# Patient Record
Sex: FEMALE
[De-identification: names, ages, dates, MRNs, and addresses within clinical notes are randomized per-mention and may not be internally consistent; named-entity substitution may affect disease eponyms.]

---

## 2018-09-13 ENCOUNTER — APPOINTMENT (OUTPATIENT)
Dept: ANTEPARTUM | Facility: CLINIC | Age: 33
End: 2018-09-13
Payer: COMMERCIAL

## 2018-09-13 ENCOUNTER — ASOB RESULT (OUTPATIENT)
Age: 33
End: 2018-09-13

## 2018-09-13 PROBLEM — Z00.00 ENCOUNTER FOR PREVENTIVE HEALTH EXAMINATION: Status: ACTIVE | Noted: 2018-09-13

## 2018-09-13 PROCEDURE — 36416 COLLJ CAPILLARY BLOOD SPEC: CPT

## 2018-09-13 PROCEDURE — 76813 OB US NUCHAL MEAS 1 GEST: CPT

## 2018-09-13 PROCEDURE — 76801 OB US < 14 WKS SINGLE FETUS: CPT

## 2018-10-30 ENCOUNTER — APPOINTMENT (OUTPATIENT)
Dept: ANTEPARTUM | Facility: CLINIC | Age: 33
End: 2018-10-30
Payer: COMMERCIAL

## 2018-10-30 ENCOUNTER — ASOB RESULT (OUTPATIENT)
Age: 33
End: 2018-10-30

## 2018-10-30 PROCEDURE — 76805 OB US >/= 14 WKS SNGL FETUS: CPT

## 2019-03-27 ENCOUNTER — INPATIENT (INPATIENT)
Facility: HOSPITAL | Age: 34
LOS: 1 days | Discharge: HOME | End: 2019-03-29
Attending: OBSTETRICS & GYNECOLOGY | Admitting: OBSTETRICS & GYNECOLOGY

## 2019-03-27 ENCOUNTER — TRANSCRIPTION ENCOUNTER (OUTPATIENT)
Age: 34
End: 2019-03-27

## 2019-03-27 VITALS
HEART RATE: 61 BPM | RESPIRATION RATE: 18 BRPM | SYSTOLIC BLOOD PRESSURE: 131 MMHG | TEMPERATURE: 98 F | DIASTOLIC BLOOD PRESSURE: 61 MMHG

## 2019-03-27 DIAGNOSIS — O26.899 OTHER SPECIFIED PREGNANCY RELATED CONDITIONS, UNSPECIFIED TRIMESTER: ICD-10-CM

## 2019-03-27 DIAGNOSIS — Z3A.00 WEEKS OF GESTATION OF PREGNANCY NOT SPECIFIED: ICD-10-CM

## 2019-03-27 LAB
BASOPHILS # BLD AUTO: 0.02 K/UL — SIGNIFICANT CHANGE UP (ref 0–0.2)
BASOPHILS NFR BLD AUTO: 0.2 % — SIGNIFICANT CHANGE UP (ref 0–2)
BLD GP AB SCN SERPL QL: NEGATIVE — SIGNIFICANT CHANGE UP
EOSINOPHIL # BLD AUTO: 0.01 K/UL — SIGNIFICANT CHANGE UP (ref 0–0.5)
EOSINOPHIL NFR BLD AUTO: 0.1 % — SIGNIFICANT CHANGE UP (ref 0–6)
HCT VFR BLD CALC: 35.5 % — SIGNIFICANT CHANGE UP (ref 34.5–45)
HGB BLD-MCNC: 11.5 G/DL — SIGNIFICANT CHANGE UP (ref 11.5–15.5)
IMM GRANULOCYTES NFR BLD AUTO: 0.4 % — SIGNIFICANT CHANGE UP (ref 0–1.5)
LYMPHOCYTES # BLD AUTO: 1.39 K/UL — SIGNIFICANT CHANGE UP (ref 1–3.3)
LYMPHOCYTES # BLD AUTO: 11.3 % — LOW (ref 13–44)
MCHC RBC-ENTMCNC: 30.2 PG — SIGNIFICANT CHANGE UP (ref 27–34)
MCHC RBC-ENTMCNC: 32.4 % — SIGNIFICANT CHANGE UP (ref 32–36)
MCV RBC AUTO: 93.2 FL — SIGNIFICANT CHANGE UP (ref 80–100)
MONOCYTES # BLD AUTO: 0.49 K/UL — SIGNIFICANT CHANGE UP (ref 0–0.9)
MONOCYTES NFR BLD AUTO: 4 % — SIGNIFICANT CHANGE UP (ref 2–14)
NEUTROPHILS # BLD AUTO: 10.38 K/UL — HIGH (ref 1.8–7.4)
NEUTROPHILS NFR BLD AUTO: 84 % — HIGH (ref 43–77)
NRBC # FLD: 0 K/UL — SIGNIFICANT CHANGE UP (ref 0–0)
PLATELET # BLD AUTO: 188 K/UL — SIGNIFICANT CHANGE UP (ref 150–400)
PMV BLD: 11.9 FL — SIGNIFICANT CHANGE UP (ref 7–13)
RBC # BLD: 3.81 M/UL — SIGNIFICANT CHANGE UP (ref 3.8–5.2)
RBC # FLD: 12.2 % — SIGNIFICANT CHANGE UP (ref 10.3–14.5)
RH IG SCN BLD-IMP: POSITIVE — SIGNIFICANT CHANGE UP
RH IG SCN BLD-IMP: POSITIVE — SIGNIFICANT CHANGE UP
T PALLIDUM AB TITR SER: NEGATIVE — SIGNIFICANT CHANGE UP
WBC # BLD: 12.34 K/UL — HIGH (ref 3.8–10.5)
WBC # FLD AUTO: 12.34 K/UL — HIGH (ref 3.8–10.5)

## 2019-03-27 RX ORDER — OXYTOCIN 10 UNIT/ML
333.33 VIAL (ML) INJECTION
Qty: 20 | Refills: 0 | Status: COMPLETED | OUTPATIENT
Start: 2019-03-27

## 2019-03-27 RX ORDER — SODIUM CHLORIDE 9 MG/ML
1000 INJECTION, SOLUTION INTRAVENOUS
Refills: 0 | Status: DISCONTINUED | OUTPATIENT
Start: 2019-03-27 | End: 2019-03-27

## 2019-03-27 RX ORDER — AER TRAVELER 0.5 G/1
1 SOLUTION RECTAL; TOPICAL EVERY 4 HOURS
Refills: 0 | Status: DISCONTINUED | OUTPATIENT
Start: 2019-03-27 | End: 2019-03-29

## 2019-03-27 RX ORDER — HYDROCORTISONE 1 %
1 OINTMENT (GRAM) TOPICAL EVERY 4 HOURS
Refills: 0 | Status: DISCONTINUED | OUTPATIENT
Start: 2019-03-27 | End: 2019-03-29

## 2019-03-27 RX ORDER — OXYTOCIN 10 UNIT/ML
2 VIAL (ML) INJECTION
Qty: 30 | Refills: 0 | Status: DISCONTINUED | OUTPATIENT
Start: 2019-03-27 | End: 2019-03-28

## 2019-03-27 RX ORDER — PRAMOXINE HYDROCHLORIDE 150 MG/15G
1 AEROSOL, FOAM RECTAL EVERY 4 HOURS
Refills: 0 | Status: DISCONTINUED | OUTPATIENT
Start: 2019-03-27 | End: 2019-03-29

## 2019-03-27 RX ORDER — OXYCODONE HYDROCHLORIDE 5 MG/1
5 TABLET ORAL EVERY 4 HOURS
Refills: 0 | Status: DISCONTINUED | OUTPATIENT
Start: 2019-03-27 | End: 2019-03-29

## 2019-03-27 RX ORDER — MAGNESIUM HYDROXIDE 400 MG/1
30 TABLET, CHEWABLE ORAL
Refills: 0 | Status: DISCONTINUED | OUTPATIENT
Start: 2019-03-27 | End: 2019-03-29

## 2019-03-27 RX ORDER — DIBUCAINE 1 %
1 OINTMENT (GRAM) RECTAL EVERY 4 HOURS
Refills: 0 | Status: DISCONTINUED | OUTPATIENT
Start: 2019-03-27 | End: 2019-03-27

## 2019-03-27 RX ORDER — LANOLIN
1 OINTMENT (GRAM) TOPICAL EVERY 6 HOURS
Refills: 0 | Status: DISCONTINUED | OUTPATIENT
Start: 2019-03-27 | End: 2019-03-29

## 2019-03-27 RX ORDER — SIMETHICONE 80 MG/1
80 TABLET, CHEWABLE ORAL EVERY 6 HOURS
Refills: 0 | Status: DISCONTINUED | OUTPATIENT
Start: 2019-03-27 | End: 2019-03-29

## 2019-03-27 RX ORDER — IBUPROFEN 200 MG
600 TABLET ORAL EVERY 6 HOURS
Refills: 0 | Status: DISCONTINUED | OUTPATIENT
Start: 2019-03-27 | End: 2019-03-29

## 2019-03-27 RX ORDER — AER TRAVELER 0.5 G/1
1 SOLUTION RECTAL; TOPICAL EVERY 4 HOURS
Refills: 0 | Status: DISCONTINUED | OUTPATIENT
Start: 2019-03-27 | End: 2019-03-27

## 2019-03-27 RX ORDER — PRAMOXINE HYDROCHLORIDE 150 MG/15G
1 AEROSOL, FOAM RECTAL EVERY 4 HOURS
Refills: 0 | Status: DISCONTINUED | OUTPATIENT
Start: 2019-03-27 | End: 2019-03-27

## 2019-03-27 RX ORDER — SODIUM CHLORIDE 9 MG/ML
1000 INJECTION, SOLUTION INTRAVENOUS ONCE
Refills: 0 | Status: COMPLETED | OUTPATIENT
Start: 2019-03-27 | End: 2019-03-27

## 2019-03-27 RX ORDER — OXYTOCIN 10 UNIT/ML
333.33 VIAL (ML) INJECTION
Qty: 20 | Refills: 0 | Status: DISCONTINUED | OUTPATIENT
Start: 2019-03-27 | End: 2019-03-28

## 2019-03-27 RX ORDER — IBUPROFEN 200 MG
600 TABLET ORAL EVERY 6 HOURS
Refills: 0 | Status: COMPLETED | OUTPATIENT
Start: 2019-03-27 | End: 2020-02-23

## 2019-03-27 RX ORDER — OXYTOCIN 10 UNIT/ML
41.67 VIAL (ML) INJECTION
Qty: 20 | Refills: 0 | Status: DISCONTINUED | OUTPATIENT
Start: 2019-03-27 | End: 2019-03-27

## 2019-03-27 RX ORDER — KETOROLAC TROMETHAMINE 30 MG/ML
30 SYRINGE (ML) INJECTION ONCE
Refills: 0 | Status: DISCONTINUED | OUTPATIENT
Start: 2019-03-27 | End: 2019-03-27

## 2019-03-27 RX ORDER — ACETAMINOPHEN 500 MG
975 TABLET ORAL EVERY 6 HOURS
Refills: 0 | Status: COMPLETED | OUTPATIENT
Start: 2019-03-27 | End: 2020-02-23

## 2019-03-27 RX ORDER — HYDROCORTISONE 1 %
1 OINTMENT (GRAM) TOPICAL EVERY 4 HOURS
Refills: 0 | Status: DISCONTINUED | OUTPATIENT
Start: 2019-03-27 | End: 2019-03-27

## 2019-03-27 RX ORDER — SODIUM CHLORIDE 9 MG/ML
3 INJECTION INTRAMUSCULAR; INTRAVENOUS; SUBCUTANEOUS EVERY 8 HOURS
Refills: 0 | Status: DISCONTINUED | OUTPATIENT
Start: 2019-03-27 | End: 2019-03-27

## 2019-03-27 RX ORDER — DOCUSATE SODIUM 100 MG
100 CAPSULE ORAL
Refills: 0 | Status: DISCONTINUED | OUTPATIENT
Start: 2019-03-27 | End: 2019-03-29

## 2019-03-27 RX ORDER — ACETAMINOPHEN 500 MG
975 TABLET ORAL EVERY 6 HOURS
Refills: 0 | Status: DISCONTINUED | OUTPATIENT
Start: 2019-03-27 | End: 2019-03-29

## 2019-03-27 RX ORDER — GLYCERIN ADULT
1 SUPPOSITORY, RECTAL RECTAL AT BEDTIME
Refills: 0 | Status: DISCONTINUED | OUTPATIENT
Start: 2019-03-27 | End: 2019-03-29

## 2019-03-27 RX ORDER — OXYCODONE HYDROCHLORIDE 5 MG/1
5 TABLET ORAL
Refills: 0 | Status: DISCONTINUED | OUTPATIENT
Start: 2019-03-27 | End: 2019-03-29

## 2019-03-27 RX ORDER — DIPHENHYDRAMINE HCL 50 MG
25 CAPSULE ORAL EVERY 6 HOURS
Refills: 0 | Status: DISCONTINUED | OUTPATIENT
Start: 2019-03-27 | End: 2019-03-29

## 2019-03-27 RX ORDER — DIBUCAINE 1 %
1 OINTMENT (GRAM) RECTAL EVERY 4 HOURS
Refills: 0 | Status: DISCONTINUED | OUTPATIENT
Start: 2019-03-27 | End: 2019-03-29

## 2019-03-27 RX ADMIN — Medication 1000 MILLIUNIT(S)/MIN: at 15:00

## 2019-03-27 RX ADMIN — SODIUM CHLORIDE 250 MILLILITER(S): 9 INJECTION, SOLUTION INTRAVENOUS at 06:29

## 2019-03-27 RX ADMIN — Medication 125 MILLIUNIT(S)/MIN: at 15:10

## 2019-03-27 RX ADMIN — Medication 30 MILLIGRAM(S): at 15:35

## 2019-03-27 RX ADMIN — Medication 975 MILLIGRAM(S): at 23:46

## 2019-03-27 RX ADMIN — Medication 2 MILLIUNIT(S)/MIN: at 10:08

## 2019-03-27 RX ADMIN — SODIUM CHLORIDE 2000 MILLILITER(S): 9 INJECTION, SOLUTION INTRAVENOUS at 05:20

## 2019-03-27 RX ADMIN — Medication 600 MILLIGRAM(S): at 23:47

## 2019-03-27 NOTE — OB RN PATIENT PROFILE - PURPOSEFUL PROACTIVE ROUNDING
ANTICOAGULATION FOLLOW-UP CLINIC VISIT    Patient Name:  Wally Cano  Date:  5/8/2017  Contact Type:  Face to Face    SUBJECTIVE:     Patient Findings     Positives No Problem Findings           OBJECTIVE    INR Protime   Date Value Ref Range Status   05/08/2017 2.8 (A) 0.86 - 1.14 Final       ASSESSMENT / PLAN  INR assessment THER    Recheck INR In: 4 DAYS    INR Location Clinic      Anticoagulation Summary as of 5/8/2017     INR goal 2.0-3.0    Today's INR 2.8    Maintenance plan No maintenance plan    Full instructions 5/8: 5 mg; 5/9: 5 mg; 5/10: 5 mg; 5/11: 5 mg; 5/12: 5 mg; 5/13: 5 mg; Otherwise No maintenance plan    No change documented Angelique Muro RN    Next INR check 5/12/2017    Target end date 6/19/2017      Anticoagulation Episode Summary     INR check location [facility]    Preferred lab     Send INR reminders to [facility] INR    Comments Estimated time frame is 3 months of anticoag treatment              See the Encounter Report to view Anticoagulation Flowsheet and Dosing Calendar (Go to Encounters tab in chart review, and find the Anticoagulation Therapy Visit)      Angelique Muro RN                Patient

## 2019-03-27 NOTE — DISCHARGE NOTE OB - PATIENT PORTAL LINK FT
You can access the [email or web address], offered by [facility], by registering with the following website: [email or web address]

## 2019-03-27 NOTE — OB PROVIDER H&P - HISTORY OF PRESENT ILLNESS
Patient is a  @ 40 3/7wks gestation who reports to triage with c/o worsening contractions since 530 yesterday.  Denies lof/vb.  Reports good fetal movements.

## 2019-03-27 NOTE — DISCHARGE NOTE OB - CARE PROVIDER_API CALL
Jessica Lucas)  Obstetrics and Gynecology  [address] [place] [address]  Phone: [phone]  Fax: [phone]  Follow Up Time:

## 2019-03-27 NOTE — CHART NOTE - NSCHARTNOTEFT_GEN_A_CORE
PA Note    Patient seen & examined for cont of management. States she is pushing with contractions    VS  T(C): 36.9 (03-27-19 @ 06:24)  HR: 102 (03-27-19 @ 09:44)  BP: 119/73 (03-27-19 @ 09:41)  RR: 18 (03-27-19 @ 05:07)  SpO2: 96% (03-27-19 @ 09:44)    /mod dayanna/+accels/no decels  Skyline q 5-6min  SVE 4.5/80/-2    cont efm/toco  will start pitocin for augmentation of labor  d/w Dr Johnathan reaves
R1 Note 03-27-19 @ 13:48    Pt evaluated for progression and increased pressure    VITALS:  T(C): 36.8 (03-27-19 @ 10:51), Max: 36.9 (03-27-19 @ 06:24)  HR: 81 (03-27-19 @ 13:46) (61 - 118)  BP: 124/78 (03-27-19 @ 13:46) (89/54 - 142/77)  RR: 18 (03-27-19 @ 05:07) (18 - 18)  SpO2: 96% (03-27-19 @ 13:44) (93% - 100%)    EFM: , mod dayanna, +[id number], variable decels  Cement: Ctx Q2-3min  VE:9.5/90/-1 with bulging bag      IMPRESSION:   FHR Category: 1  Additional:    PLAN:  Cytotec:  [ ] Continue PO Cyto     [ ] Continue VCyto   [ ] Stop Cytotec  Pitocin: [ ] Start Pitocin   [ ] Increase Pitocin  [  ] Continue Pitocin  [ ] Decrease Pitocin   [ ] Discontinue Pitocin  [ x]Expectant management [ ]Peanut ball [ x]Labor down  [ x]Re-evaluate  Additional:    Alton Guzman PGY-1
Cat 1 fhr tracing, fhr 145 with mod variability, accels, late decels  contractions q4-5min  sve 7/90/-2 with bulging membranes  will notify Dr. Johnathan Harvey NP
S:  Pt seen and examined for cervical change given 4min discontinuity in FH tracing with audible decel per RN. Pt without complaints    O:   T(C): 36.9 (06:24)  HR: 78 (07:19)  BP: 120/65 (07:11)  RR: 18 (05:07)  SpO2: 97% (07:14)  SVE: 4/80/-2  EFM: baseline 135, mod dayanna, +accels, audible decel of unclear duration given discontinuity in tracing   Luck: ctxbs q5-8min       A/P: 33y at  at 40w3d admitted in labor   -decel resolved with repositioning. Resucitative measures initiated IVF bolus, lateral repositioning, O2  -given unclear duration of decel 2/2 discontinuity of FH plan for placement of ISE if continued discontinuity. At this time FH continuous.   -consider augmentation if ctxns space further or if no cervical change on next VE    d/w Dr. Johnathan Huerta PGY2

## 2019-03-27 NOTE — OB PROVIDER H&P - NSHPPHYSICALEXAM_GEN_ALL_CORE
Abdomen gravid, soft and nontender.  SVE - 3.5/80/-3 Intact  Cephalic presentation   GBS - negative      Discussed patient with Dr Mann.  Plan:  admit patient to L&D for management.  Approved for an epidural.

## 2019-03-27 NOTE — OB PROVIDER H&P - ASSESSMENT
Abdomen gravid, soft and nontender.  SVE - 3.5/80/-3 Intact, moderate contractions  Cephalic presentation   GBS - negative  Clinical EFW - 3100g

## 2019-03-27 NOTE — OB PROVIDER DELIVERY SUMMARY - NSPROVIDERDELIVERYNOTE_OBGYN_ALL_OB_FT
Spontaneous vaginal delivery of liveborn infant in CHERRIE position. Head delivered easily. No nuchal cord was noted. Shoulders and body delivered easily after. Infant was suctioned. No mec was noted. Infant was passed to mother for delayed cord clamping and skin to skin. After 1 minute, the cord was clamped and cut. Placenta delivered intact with a 3 vessel cord noted. Uterine fundal massage and post partum pitocin was started. Uterine fundus was firm. Vaginal exam was performed and noted intact cervix, vaginal walls and sulci. 2 degree laceration was noted and repaired with 2.0 chromic suture. Abrasion was noted and also repaired with 2.0 chromic suture. Excellent hemostasis was noted.  Count was correctx2. Pt. was stable after delivery. Spontaneous vaginal delivery of liveborn infant in CHERRIE position. Head delivered easily. No nuchal cord was noted. Shoulders and body delivered easily after. Infant was suctioned. No mec was noted. Infant was passed to mother for delayed cord clamping and skin to skin. After 1 minute, the cord was clamped and cut. Placenta delivered intact with a 3 vessel cord noted. Uterine fundal massage and post partum pitocin was started. Uterine fundus was firm. Vaginal exam was performed and noted intact cervix, vaginal walls and sulci. 2 degree laceration was noted and repaired with 2.0 chromic suture. Abrasion was noted and also repaired with 2.0 chromic suture. Excellent hemostasis was noted.  Count was correctx2. Pt. was stable after delivery.    Mother and baby in stable condition.  KAREL Mann

## 2019-03-27 NOTE — DISCHARGE NOTE OB - CARE PLAN
Principal Discharge DX:	Vaginal delivery  Goal:	recovery  Assessment and plan of treatment:	return visit x 6 weeks, pelvic rest, regular diet

## 2019-03-28 RX ADMIN — Medication 975 MILLIGRAM(S): at 00:28

## 2019-03-28 RX ADMIN — Medication 975 MILLIGRAM(S): at 07:09

## 2019-03-28 RX ADMIN — Medication 975 MILLIGRAM(S): at 12:30

## 2019-03-28 RX ADMIN — Medication 600 MILLIGRAM(S): at 06:15

## 2019-03-28 RX ADMIN — Medication 600 MILLIGRAM(S): at 17:55

## 2019-03-28 RX ADMIN — Medication 975 MILLIGRAM(S): at 17:55

## 2019-03-28 RX ADMIN — Medication 975 MILLIGRAM(S): at 12:01

## 2019-03-28 RX ADMIN — Medication 600 MILLIGRAM(S): at 23:41

## 2019-03-28 RX ADMIN — Medication 600 MILLIGRAM(S): at 12:00

## 2019-03-28 RX ADMIN — Medication 975 MILLIGRAM(S): at 18:30

## 2019-03-28 RX ADMIN — Medication 600 MILLIGRAM(S): at 07:09

## 2019-03-28 RX ADMIN — Medication 975 MILLIGRAM(S): at 06:16

## 2019-03-28 RX ADMIN — Medication 600 MILLIGRAM(S): at 00:28

## 2019-03-28 RX ADMIN — Medication 600 MILLIGRAM(S): at 18:30

## 2019-03-28 RX ADMIN — Medication 1 TABLET(S): at 12:00

## 2019-03-28 RX ADMIN — Medication 600 MILLIGRAM(S): at 12:30

## 2019-03-28 RX ADMIN — Medication 975 MILLIGRAM(S): at 23:39

## 2019-03-28 NOTE — LACTATION INITIAL EVALUATION - INTERVENTION OUTCOME
Assisted pt with positioning to facilitate deep latch on breasts bilaterally. Reviewed feeding on demand, recognizing feeding cues and utilizing feeding log. Safe skin to skin, safe sleep and rooming in promoted. Hand expression demonstrated and encouraged-colostrum noted./verbalizes understanding/demonstrates understanding of teaching/good return demonstration

## 2019-03-28 NOTE — PROGRESS NOTE ADULT - SUBJECTIVE AND OBJECTIVE BOX
S: Patient doing well. Minimal lochia. Pain controlled.    O: Vital Signs Last 24 Hrs  T(C): 36.4 (28 Mar 2019 06:29), Max: 37 (27 Mar 2019 15:01)  T(F): 97.5 (28 Mar 2019 06:29), Max: 98.6 (27 Mar 2019 15:01)  HR: 80 (28 Mar 2019 06:29) (65 - 123)  BP: 102/64 (28 Mar 2019 06:29) (102/64 - 134/73)  BP(mean): --  RR: 18 (28 Mar 2019 06:29) (17 - 18)  SpO2: 96% (28 Mar 2019 06:29) (88% - 100%)    Gen: NAD  Abd: soft, NT, ND, fundus firm below umbilicus  Lochia: moderate  Ext: no tenderness    Labs:                        11.5   12.34 )-----------( 188      ( 27 Mar 2019 04:30 )             35.5       A: 33y PPD# s/p  doing well.    Plan: stable ppd1          for discharge in am

## 2019-03-29 VITALS
RESPIRATION RATE: 18 BRPM | HEART RATE: 63 BPM | SYSTOLIC BLOOD PRESSURE: 113 MMHG | OXYGEN SATURATION: 98 % | TEMPERATURE: 99 F | DIASTOLIC BLOOD PRESSURE: 70 MMHG

## 2019-03-29 RX ADMIN — Medication 975 MILLIGRAM(S): at 06:26

## 2019-03-29 RX ADMIN — Medication 600 MILLIGRAM(S): at 00:15

## 2019-03-29 RX ADMIN — Medication 600 MILLIGRAM(S): at 06:26

## 2019-03-29 RX ADMIN — Medication 975 MILLIGRAM(S): at 00:15

## 2019-03-29 RX ADMIN — Medication 975 MILLIGRAM(S): at 06:59

## 2019-03-29 RX ADMIN — Medication 600 MILLIGRAM(S): at 06:59

## 2019-12-03 NOTE — OB RN PATIENT PROFILE - LIVING CHILDREN, OB PROFILE
0
Is This A New Presentation, Or A Follow-Up?: Skin Lesion
What Type Of Note Output Would You Prefer (Optional)?: Bullet Format
How Severe Is Your Skin Lesion?: mild
Has Your Skin Lesion Been Treated?: not been treated
Additional History: Pay would like checked it is new.

## 2020-04-23 ENCOUNTER — RESULT REVIEW (OUTPATIENT)
Age: 35
End: 2020-04-23

## 2020-05-11 PROBLEM — Z00.00 ENCOUNTER FOR PREVENTIVE HEALTH EXAMINATION: Status: ACTIVE | Noted: 2020-05-11

## 2020-10-06 ENCOUNTER — APPOINTMENT (OUTPATIENT)
Dept: ANTEPARTUM | Facility: CLINIC | Age: 35
End: 2020-10-06
Payer: COMMERCIAL

## 2020-10-06 ENCOUNTER — ASOB RESULT (OUTPATIENT)
Age: 35
End: 2020-10-06

## 2020-10-06 PROCEDURE — 76813 OB US NUCHAL MEAS 1 GEST: CPT

## 2020-10-06 PROCEDURE — 76801 OB US < 14 WKS SINGLE FETUS: CPT

## 2020-10-06 PROCEDURE — 36416 COLLJ CAPILLARY BLOOD SPEC: CPT

## 2020-11-23 ENCOUNTER — ASOB RESULT (OUTPATIENT)
Age: 35
End: 2020-11-23

## 2020-11-23 ENCOUNTER — TRANSCRIPTION ENCOUNTER (OUTPATIENT)
Age: 35
End: 2020-11-23

## 2020-11-23 ENCOUNTER — APPOINTMENT (OUTPATIENT)
Dept: ANTEPARTUM | Facility: CLINIC | Age: 35
End: 2020-11-23
Payer: COMMERCIAL

## 2020-11-23 PROCEDURE — 76805 OB US >/= 14 WKS SNGL FETUS: CPT

## 2020-12-08 ENCOUNTER — ASOB RESULT (OUTPATIENT)
Age: 35
End: 2020-12-08

## 2020-12-08 ENCOUNTER — APPOINTMENT (OUTPATIENT)
Dept: ANTEPARTUM | Facility: CLINIC | Age: 35
End: 2020-12-08
Payer: COMMERCIAL

## 2020-12-08 PROCEDURE — 76816 OB US FOLLOW-UP PER FETUS: CPT

## 2020-12-08 PROCEDURE — 99072 ADDL SUPL MATRL&STAF TM PHE: CPT

## 2021-04-16 ENCOUNTER — INPATIENT (INPATIENT)
Facility: HOSPITAL | Age: 36
LOS: 1 days | Discharge: HOME | End: 2021-04-18
Attending: OBSTETRICS & GYNECOLOGY | Admitting: OBSTETRICS & GYNECOLOGY

## 2021-04-16 ENCOUNTER — TRANSCRIPTION ENCOUNTER (OUTPATIENT)
Age: 36
End: 2021-04-16

## 2021-04-16 VITALS — DIASTOLIC BLOOD PRESSURE: 79 MMHG | SYSTOLIC BLOOD PRESSURE: 136 MMHG | HEART RATE: 78 BPM

## 2021-04-16 DIAGNOSIS — Z3A.00 WEEKS OF GESTATION OF PREGNANCY NOT SPECIFIED: ICD-10-CM

## 2021-04-16 DIAGNOSIS — O26.899 OTHER SPECIFIED PREGNANCY RELATED CONDITIONS, UNSPECIFIED TRIMESTER: ICD-10-CM

## 2021-04-16 LAB
BASOPHILS # BLD AUTO: 0.02 K/UL — SIGNIFICANT CHANGE UP (ref 0–0.2)
BASOPHILS NFR BLD AUTO: 0.1 % — SIGNIFICANT CHANGE UP (ref 0–2)
EOSINOPHIL # BLD AUTO: 0.08 K/UL — SIGNIFICANT CHANGE UP (ref 0–0.5)
EOSINOPHIL NFR BLD AUTO: 0.5 % — SIGNIFICANT CHANGE UP (ref 0–6)
HCT VFR BLD CALC: 35.4 % — SIGNIFICANT CHANGE UP (ref 34.5–45)
HGB BLD-MCNC: 11.9 G/DL — SIGNIFICANT CHANGE UP (ref 11.5–15.5)
IANC: 10.97 K/UL — HIGH (ref 1.5–8.5)
IMM GRANULOCYTES NFR BLD AUTO: 0.3 % — SIGNIFICANT CHANGE UP (ref 0–1.5)
LYMPHOCYTES # BLD AUTO: 22.4 % — SIGNIFICANT CHANGE UP (ref 13–44)
LYMPHOCYTES # BLD AUTO: 3.48 K/UL — HIGH (ref 1–3.3)
MCHC RBC-ENTMCNC: 30.9 PG — SIGNIFICANT CHANGE UP (ref 27–34)
MCHC RBC-ENTMCNC: 33.6 GM/DL — SIGNIFICANT CHANGE UP (ref 32–36)
MCV RBC AUTO: 91.9 FL — SIGNIFICANT CHANGE UP (ref 80–100)
MONOCYTES # BLD AUTO: 0.93 K/UL — HIGH (ref 0–0.9)
MONOCYTES NFR BLD AUTO: 6 % — SIGNIFICANT CHANGE UP (ref 2–14)
NEUTROPHILS # BLD AUTO: 10.97 K/UL — HIGH (ref 1.8–7.4)
NEUTROPHILS NFR BLD AUTO: 70.7 % — SIGNIFICANT CHANGE UP (ref 43–77)
NRBC # BLD: 0 /100 WBCS — SIGNIFICANT CHANGE UP
NRBC # FLD: 0 K/UL — SIGNIFICANT CHANGE UP
PLATELET # BLD AUTO: 187 K/UL — SIGNIFICANT CHANGE UP (ref 150–400)
RBC # BLD: 3.85 M/UL — SIGNIFICANT CHANGE UP (ref 3.8–5.2)
RBC # FLD: 12.4 % — SIGNIFICANT CHANGE UP (ref 10.3–14.5)
WBC # BLD: 15.52 K/UL — HIGH (ref 3.8–10.5)
WBC # FLD AUTO: 15.52 K/UL — HIGH (ref 3.8–10.5)

## 2021-04-16 RX ORDER — SODIUM CHLORIDE 9 MG/ML
1000 INJECTION, SOLUTION INTRAVENOUS
Refills: 0 | Status: DISCONTINUED | OUTPATIENT
Start: 2021-04-16 | End: 2021-04-17

## 2021-04-16 RX ORDER — OXYTOCIN 10 UNIT/ML
VIAL (ML) INJECTION
Qty: 20 | Refills: 0 | Status: DISCONTINUED | OUTPATIENT
Start: 2021-04-16 | End: 2021-04-17

## 2021-04-16 NOTE — OB PROVIDER LABOR PROGRESS NOTE - ASSESSMENT
34yo  in labor  - continuous monitoring  - anesthesia contacted for epidural placement  - routine labor care    d/w Dr. Speedy Prado PGY1

## 2021-04-16 NOTE — OB RN TRIAGE NOTE - NS_GESTAGE_OBGYN_ALL_OB_FT
extruded tube in canal   [] cerumen    [] purulent discharge     [] fungal elements/ discharge     [] erythema       [] edema     [] foreign body      [] furuncle     [] osteoma      [] narrowed      [] TM's normal     [] perforated   [] mobile      [x] dull    [] retracted       [x] sluggish     [] erythematous      [] bulging   [] air/ fluid level     [] purulent fluid     [] dorys fluid       [] bullae     [] surgical changes    [] cholesteatoma       tympanosclerosis:  [] mild  [] moderate  [] severe      perforation:  [] central  [] marginal  [] at tube site         pinhole: [] 10%  []  25% [] 50%  [] 75%  [] total  [] with purulent discharge       ear tubes:   [] patent dry good position    [] in position but occluded     [] in position but extruding   [] extruded tube in canal      [] purulent discharge through tube  [] granulation tissue          Hearing:    [] grossly intact     [] diminished   Rinne A>B:  [] L   [] R     Rinne A<B: [] L   []  R     Rinne A=B :  [] L   [] R     [] Flores M    [] Flores L     [] Flores R     [] Air R=L      [] Air R>L     [] Air R<L      [] see audiogram    Nose:     [x] nares normal   [] septum midline   septum deviated  []R  []L    [] mucosa normal  [] mucosa congested     Discharge: [] Y [] N        []clear  []purulent    []serous  []bloody  [] moucoid     Turbinates: [] normal     [] hypertrophic    [] active bleeding    [] clotted blood   polyps [] N [] Y  [] L [] R  [] B    mass [] N   [] Y  [] L [] R  [] B    sinus tenderness [] N  [] Y  [] L [] R  [] B     [] see endoscopy report    Oral:    Lips: [] normal   [] leision: [] upper [] lower   [] cleft lip    [] scarring     [] s/p cleft repair    [] maxillary rfemun       Teeth: []good dentition    [] teething       []caries    []malocclosion       [] dental crown(s)   [] orthondtia       TMJ pain: [] Y [] N     Palate: []normal     [] cleft      [] sub mucous cleft   [] bifid uvula     [] ulcers   [] torus      Tongue:
40w

## 2021-04-16 NOTE — OB PROVIDER H&P - HISTORY OF PRESENT ILLNESS
36 y/o pt 40 weeks  presents to triage with c/o painful contractions every 4-5 minutes. pt denies any lof or bleeding. pt denies n/v/d, fever or chills. pt endorses +fetal movement  AP uncomplicated thus far    NKDA  PMH: denies  PSH: denies  OB:   2017 F 6#11  GYN: denies  Social hx: denies  Medications:  PNV

## 2021-04-16 NOTE — OB PROVIDER TRIAGE NOTE - HISTORY OF PRESENT ILLNESS
34 y/o pt 40 weeks  presents to triage with c/o painful contractions every 4-5 minutes. pt denies any lof or bleeding. pt denies n/v/d, fever or chills. pt endorses +fetal movement  AP uncomplicated thus far    NKDA  PMH: denies  PSH: denies  OB:   2017 F 6#11  GYN: denies  Social hx: denies  Medications:  PNV

## 2021-04-16 NOTE — OB PROVIDER H&P - NSHPPHYSICALEXAM_GEN_ALL_CORE
Vital Signs Last 24 Hrs  T(C): --  T(F): --  HR: 78 (16 Apr 2021 22:12) (78 - 78)  BP: 136/79 (16 Apr 2021 22:12) (136/79 - 136/79)  BP(mean): --  RR: --  SpO2: --    Abdomen: soft, non tender. no guarding or rebound tenderness  SVE: 3-4/70/-3  TAS: vertex presentation  EFW 3317gm by Leopold's     NST in progress

## 2021-04-16 NOTE — CHART NOTE - NSCHARTNOTEFT_GEN_A_CORE
Pt seen and examined at bedside for c/o increased pelvic pressure    Vital Signs Last 24 Hrs  T(C): 36.9 (16 Apr 2021 22:37), Max: 36.9 (16 Apr 2021 22:37)  T(F): 98.4 (16 Apr 2021 22:37), Max: 98.4 (16 Apr 2021 22:37)  HR: 78 (16 Apr 2021 22:37) (78 - 78)  BP: 136/79 (16 Apr 2021 22:37) (136/79 - 136/79)  BP(mean): --  RR: 18 (16 Apr 2021 22:37) (18 - 18)  SpO2: --    SVE: 4.5/80/-2    NST reactive with moderate variability, cat 1  toxo ctx 3-5 minutes    Plan:  -Continue

## 2021-04-17 LAB
BLD GP AB SCN SERPL QL: NEGATIVE — SIGNIFICANT CHANGE UP
COVID-19 SPIKE DOMAIN AB INTERP: POSITIVE
COVID-19 SPIKE DOMAIN ANTIBODY RESULT: >250 U/ML — HIGH
RH IG SCN BLD-IMP: POSITIVE — SIGNIFICANT CHANGE UP
SARS-COV-2 IGG+IGM SERPL QL IA: >250 U/ML — HIGH
SARS-COV-2 IGG+IGM SERPL QL IA: POSITIVE
SARS-COV-2 RNA SPEC QL NAA+PROBE: SIGNIFICANT CHANGE UP
T PALLIDUM AB TITR SER: NEGATIVE — SIGNIFICANT CHANGE UP

## 2021-04-17 RX ORDER — TETANUS TOXOID, REDUCED DIPHTHERIA TOXOID AND ACELLULAR PERTUSSIS VACCINE, ADSORBED 5; 2.5; 8; 8; 2.5 [IU]/.5ML; [IU]/.5ML; UG/.5ML; UG/.5ML; UG/.5ML
0.5 SUSPENSION INTRAMUSCULAR ONCE
Refills: 0 | Status: DISCONTINUED | OUTPATIENT
Start: 2021-04-17 | End: 2021-04-18

## 2021-04-17 RX ORDER — HYDROCORTISONE 1 %
1 OINTMENT (GRAM) TOPICAL EVERY 6 HOURS
Refills: 0 | Status: DISCONTINUED | OUTPATIENT
Start: 2021-04-17 | End: 2021-04-18

## 2021-04-17 RX ORDER — ACETAMINOPHEN 500 MG
975 TABLET ORAL
Refills: 0 | Status: DISCONTINUED | OUTPATIENT
Start: 2021-04-17 | End: 2021-04-18

## 2021-04-17 RX ORDER — KETOROLAC TROMETHAMINE 30 MG/ML
30 SYRINGE (ML) INJECTION ONCE
Refills: 0 | Status: DISCONTINUED | OUTPATIENT
Start: 2021-04-17 | End: 2021-04-17

## 2021-04-17 RX ORDER — SENNA PLUS 8.6 MG/1
1 TABLET ORAL
Refills: 0 | Status: DISCONTINUED | OUTPATIENT
Start: 2021-04-17 | End: 2021-04-18

## 2021-04-17 RX ORDER — IBUPROFEN 200 MG
600 TABLET ORAL EVERY 6 HOURS
Refills: 0 | Status: DISCONTINUED | OUTPATIENT
Start: 2021-04-17 | End: 2021-04-18

## 2021-04-17 RX ORDER — MAGNESIUM HYDROXIDE 400 MG/1
30 TABLET, CHEWABLE ORAL
Refills: 0 | Status: DISCONTINUED | OUTPATIENT
Start: 2021-04-17 | End: 2021-04-18

## 2021-04-17 RX ORDER — DIPHENHYDRAMINE HCL 50 MG
25 CAPSULE ORAL EVERY 6 HOURS
Refills: 0 | Status: DISCONTINUED | OUTPATIENT
Start: 2021-04-17 | End: 2021-04-18

## 2021-04-17 RX ORDER — OXYCODONE HYDROCHLORIDE 5 MG/1
5 TABLET ORAL ONCE
Refills: 0 | Status: DISCONTINUED | OUTPATIENT
Start: 2021-04-17 | End: 2021-04-18

## 2021-04-17 RX ORDER — AER TRAVELER 0.5 G/1
1 SOLUTION RECTAL; TOPICAL EVERY 4 HOURS
Refills: 0 | Status: DISCONTINUED | OUTPATIENT
Start: 2021-04-17 | End: 2021-04-18

## 2021-04-17 RX ORDER — SIMETHICONE 80 MG/1
80 TABLET, CHEWABLE ORAL EVERY 4 HOURS
Refills: 0 | Status: DISCONTINUED | OUTPATIENT
Start: 2021-04-17 | End: 2021-04-18

## 2021-04-17 RX ORDER — OXYTOCIN 10 UNIT/ML
333.33 VIAL (ML) INJECTION
Qty: 20 | Refills: 0 | Status: DISCONTINUED | OUTPATIENT
Start: 2021-04-17 | End: 2021-04-18

## 2021-04-17 RX ORDER — BENZOCAINE 10 %
1 GEL (GRAM) MUCOUS MEMBRANE EVERY 6 HOURS
Refills: 0 | Status: DISCONTINUED | OUTPATIENT
Start: 2021-04-17 | End: 2021-04-18

## 2021-04-17 RX ORDER — OXYTOCIN 10 UNIT/ML
41.67 VIAL (ML) INJECTION
Qty: 20 | Refills: 0 | Status: DISCONTINUED | OUTPATIENT
Start: 2021-04-17 | End: 2021-04-18

## 2021-04-17 RX ORDER — DIBUCAINE 1 %
1 OINTMENT (GRAM) RECTAL EVERY 6 HOURS
Refills: 0 | Status: DISCONTINUED | OUTPATIENT
Start: 2021-04-17 | End: 2021-04-18

## 2021-04-17 RX ORDER — IBUPROFEN 200 MG
600 TABLET ORAL EVERY 6 HOURS
Refills: 0 | Status: COMPLETED | OUTPATIENT
Start: 2021-04-17 | End: 2022-03-16

## 2021-04-17 RX ORDER — PRAMOXINE HYDROCHLORIDE 150 MG/15G
1 AEROSOL, FOAM RECTAL EVERY 4 HOURS
Refills: 0 | Status: DISCONTINUED | OUTPATIENT
Start: 2021-04-17 | End: 2021-04-18

## 2021-04-17 RX ORDER — OXYCODONE HYDROCHLORIDE 5 MG/1
5 TABLET ORAL
Refills: 0 | Status: DISCONTINUED | OUTPATIENT
Start: 2021-04-17 | End: 2021-04-18

## 2021-04-17 RX ORDER — LANOLIN
1 OINTMENT (GRAM) TOPICAL EVERY 6 HOURS
Refills: 0 | Status: DISCONTINUED | OUTPATIENT
Start: 2021-04-17 | End: 2021-04-18

## 2021-04-17 RX ORDER — SODIUM CHLORIDE 9 MG/ML
3 INJECTION INTRAMUSCULAR; INTRAVENOUS; SUBCUTANEOUS EVERY 8 HOURS
Refills: 0 | Status: DISCONTINUED | OUTPATIENT
Start: 2021-04-17 | End: 2021-04-18

## 2021-04-17 RX ADMIN — Medication 600 MILLIGRAM(S): at 15:30

## 2021-04-17 RX ADMIN — Medication 975 MILLIGRAM(S): at 21:40

## 2021-04-17 RX ADMIN — Medication 1000 MILLIUNIT(S)/MIN: at 00:04

## 2021-04-17 RX ADMIN — Medication 600 MILLIGRAM(S): at 14:30

## 2021-04-17 RX ADMIN — Medication 1000 MILLIUNIT(S)/MIN: at 00:25

## 2021-04-17 RX ADMIN — SODIUM CHLORIDE 3 MILLILITER(S): 9 INJECTION INTRAMUSCULAR; INTRAVENOUS; SUBCUTANEOUS at 14:39

## 2021-04-17 RX ADMIN — Medication 30 MILLIGRAM(S): at 00:48

## 2021-04-17 RX ADMIN — Medication 125 MILLIUNIT(S)/MIN: at 00:09

## 2021-04-17 RX ADMIN — Medication 975 MILLIGRAM(S): at 20:59

## 2021-04-17 RX ADMIN — Medication 30 MILLIGRAM(S): at 01:15

## 2021-04-17 NOTE — OB NEONATOLOGY/PEDIATRICIAN DELIVERY SUMMARY - NSPEDSNEONOTESA_OBGYN_ALL_OB_FT
Baby is a 40.1 wk [place] female born to a 34y/o  mother via . PEDS called to delivery for meconium. Maternal history uncomplicated. Prenatal history uncomplicated. Maternal blood type A+. PNL negative, non-reactive, and immune. GBS negative on 3/22. AROM at 23:57 on , light mec fluids. Baby born vigorous and crying spontaneously. Warmed, dried, stimulated. Apgars 9/9. EOS 0.04. Mom plans to breastfeed and consentss hepB. Baby is a 40.1 wk [place] female born to a 36y/o  mother via . PEDS called to delivery for meconium. Maternal history uncomplicated. Prenatal history uncomplicated. Maternal blood type A+. PNL negative, non-reactive, and immune. GBS negative on 3/22. AROM at 23:57 on , light mec fluids. Baby born vigorous and crying spontaneously. Warmed, dried, stimulated. Apgars 9/9. EOS 0.04. Mom plans to breastfeed and consentss hepB. EL Gay present at delivery

## 2021-04-17 NOTE — DISCHARGE NOTE OB - PATIENT PORTAL LINK FT
You can access the FollowMyHealth Patient Portal offered by [facility] by registering at the following website: [email or web address]. By joining [employer]’s FollowMyHealth portal, you will also be able to view your health information using other applications (apps) compatible with our system.

## 2021-04-17 NOTE — DISCHARGE NOTE OB - MEDICATION SUMMARY - MEDICATIONS TO TAKE
I will START or STAY ON the medications listed below when I get home from the hospital:  None I will START or STAY ON the medications listed below when I get home from the hospital:    Prenatal Multivitamins with Folic Acid 1 mg oral tablet  -- 1 tab(s) by mouth once a day  -- Indication: For Vaginal delivery

## 2021-04-17 NOTE — OB RN DELIVERY SUMMARY - NS_SEPSISRSKCALC_OBGYN_ALL_OB_FT
EOS calculated successfully. EOS Risk Factor: 0.5/1000 live births ([facility] national incidence); GA=40w1d; Temp=98.4; ROM=0.083; GBS='Negative'; Antibiotics='No antibiotics or any antibiotics < 2 hrs prior to birth'

## 2021-04-17 NOTE — DISCHARGE NOTE OB - CARE PROVIDER_API CALL
Laquita Villeda)  Obstetrics and Gynecology  [address], [address]  [place] [address]  Phone: [phone]  Fax: [phone]  Follow Up Time:

## 2021-04-17 NOTE — OB RN DELIVERY SUMMARY - NSSELHIDDEN_OBGYN_ALL_OB_FT
[NS_DeliveryAttending1_OBGYN_ALL_OB_FT:[email or web address]=],[NS_DeliveryRN_OBGYN_ALL_OB_FT:[email or web address]=]

## 2021-04-17 NOTE — OB RN DELIVERY SUMMARY - NS_NUCHALCORD_OBGYN_ALL_OB
Patient is tachycardic and hypertensive. C/o mild anxiety but otherwise has no other complaints. RR and Spo2 are stable. Sat up in chair position in bed x 1 hour. Notified Dr Son.    None

## 2021-04-17 NOTE — DISCHARGE NOTE OB - MATERIALS PROVIDED
Vaccinations/[facility]  Screening Program/  Immunization Record/Breastfeeding Log/Guide to Postpartum Care/[facility] Hearing Screen Program/Back To Sleep Handout/Shaken Baby Prevention Handout/Birth Certificate Instructions/Tdap Vaccination (VIS Pub Date: 2012)

## 2021-04-17 NOTE — OB PROVIDER DELIVERY SUMMARY - NSPROVIDERDELIVERYNOTE_OBGYN_ALL_OB_FT
Attending Note   Pt progressed to 10/100/2   AROMed performed for meconium   Vertex delivered over intact perineum followed by the body and shoulders   Delayed cord clamping   Placenta delivered intact  Bimanual exam reveal few clots in RICHY removed, pitocin doubled  Vagina, rectum and cervix inspected   1st degree laceration noted and repaired in usual fashion   rectal exam intact after delivery        R Speedy-Jon

## 2021-04-17 NOTE — PROGRESS NOTE ADULT - SUBJECTIVE AND OBJECTIVE BOX
Subjective  Pain: none  Complaints:none  Milestones: Alert and orientedx3. Out of bed ambulating. Voiding. Tolerating a regular diet.  Infant feeding:                                 Feeding related issues or concerns:none    Objective   T(C): 36.9 (04-17-21 @ 10:00), Max: 36.9 (04-16-21 @ 22:37)  HR: 80 (04-17-21 @ 10:00) (63 - 179)  BP: 106/56 (04-17-21 @ 10:00) (106/56 - 136/79)  RR: 18 (04-17-21 @ 10:00) (18 - 22)  SpO2: 100% (04-17-21 @ 10:00) (77% - 100%)  Wt(kg): --    Breasts: soft, non-tender; no engorgement  Abd: Fundus firm; non-tender  Lochia:moderate  Lower extremities: no cyanosis, clubbing, or edema    LABS:                        11.9   15.52 )-----------( 187      ( 16 Apr 2021 23:15 )             35.4     ABO Interpretation: A (04-16 @ 23:11)  Rh Interpretation: Positive (04-16 @ 23:11)          Plan: Increase ambulation, analgesia PRN and pain medication protocal standing oxycodone, ibuprofen and acetaminophen.  Diet: Continue regular diet      Continue routine postpartum care.

## 2021-04-17 NOTE — OB NEONATOLOGY/PEDIATRICIAN DELIVERY SUMMARY - BABY A: APGAR 1 MIN COLOR, DELIVERY
Telephone Encounter by Nona Medina RN, BSN at 11/03/17 09:08 AM     Author:  Nona Medina RN, BSN Service:  (none) Author Type:  Registered Nurse     Filed:  11/03/17 09:12 AM Encounter Date:  11/3/2017 Status:  Signed     :  Nona Medina RN, BSN (Registered Nurse)            Patient notified.  Ines verbalized understanding of information given.[LH1.1T]  Patient states she would like her lipid and TSH checked at this time-- thought these were to be checked every 6 months and would like these ordered.  Patient will come in tomorrow morning for blood work.    To PCP: okay to order lipid and tsh?  Thank you.[LH1.1M]      Revision History        User Key Date/Time User Provider Type Action    > LH1.1 11/03/17 09:12 AM Nona Medina RN, BSN Registered Nurse Sign    M - Manual, T - Template             (1) body pink, extremities blue

## 2021-04-17 NOTE — DISCHARGE NOTE OB - THE PATIENT HAS
no difficulties Breath sounds are clear, no distress present, no wheeze, rales, rhonchi or tachypnea. Normal rate and effort. breathing comfortably

## 2021-04-18 VITALS
HEART RATE: 70 BPM | RESPIRATION RATE: 20 BRPM | TEMPERATURE: 98 F | DIASTOLIC BLOOD PRESSURE: 62 MMHG | OXYGEN SATURATION: 100 % | SYSTOLIC BLOOD PRESSURE: 110 MMHG

## 2021-07-21 NOTE — DISCHARGE NOTE OB - AVOID SEXUAL ACTIVITY UNTIL YOUR POSTPARTUM VISIT
Yenny Arce is a 63-year-old male who is a patient of Doyle Lipscomb. Carrie Gould sent social service a referral to assist with life alert information and ACP packet.  called and introduced herself to Dory Templeton.  inquired about life alert and if Dory Templeton had a specific one he was interested in.  informed Doryalvino Andersonsweta she has a list of different life alert brands from the [address] [place] [address]. Dory Templeton reports that he is not interested in life alerts.  inquire about ACP paperwork.  informed Dory Templeton she could mail him POA and living will information.  informed him that she could call back in a week or 2 to follow up on questions or assist with process. Dory Templeton reports that he already has that paperwork complete and should be on file.  inquired about other resources Dory Templeton would be interested in. Dory Templeton declined the need for other resources. Dory Templeton thanked  for calling. Plan:    will route message to Carrie Gould. Statement Selected

## 2022-03-30 NOTE — OB PROVIDER H&P - PROBLEM SELECTOR PLAN 1
-ADmit l&d. Routine labs  -Expectant management of labor  -Fetus: cat 1 tracing, vertex presentation, continuous monitoring  -GBS negative  -Pain: Patient approved for epidural   -Covid 19 pending for patient and support person  -Consents signed and witnessed at bedside
no

## 2022-05-09 NOTE — DISCHARGE NOTE OB - FUNCTIONAL STATUS DATE
Pt requested chantix Rx. This medication is usually Rx'ed by smoking cessation. Please forwards to smoking cessation and see if it's still appropriate part of her treatment.    18-Apr-2021

## 2022-06-23 NOTE — OB PROVIDER H&P - LIVING CHILDREN, OB PROFILE
Problem: Pain  Goal: Verbalizes/displays adequate comfort level or baseline comfort level  Outcome: Progressing     Problem: Skin/Tissue Integrity  Goal: Absence of new skin breakdown  Description: 1. Monitor for areas of redness and/or skin breakdown  2. Assess vascular access sites hourly  3. Every 4-6 hours minimum:  Change oxygen saturation probe site  4. Every 4-6 hours:  If on nasal continuous positive airway pressure, respiratory therapy assess nares and determine need for appliance change or resting period.   Outcome: Progressing     Problem: Nutrition Deficit:  Goal: Optimize nutritional status  Outcome: Progressing 0

## 2022-10-19 NOTE — OB RN PATIENT PROFILE - NSTOBACCONEVERSMOKERY/N_GEN_A
Mom is calling to make an appt for Ping. Thinks she still has the uti She finished most of her antibiotics but started to have hives at the end. Please call Mom at [phone]   No

## 2023-05-12 ENCOUNTER — EMERGENCY (EMERGENCY)
Facility: HOSPITAL | Age: 38
LOS: 1 days | Discharge: HOME | End: 2023-05-12
Attending: STUDENT IN AN ORGANIZED HEALTH CARE EDUCATION/TRAINING PROGRAM
Payer: COMMERCIAL

## 2023-05-12 VITALS
HEART RATE: 83 BPM | HEIGHT: 66 IN | DIASTOLIC BLOOD PRESSURE: 80 MMHG | RESPIRATION RATE: 16 BRPM | OXYGEN SATURATION: 100 % | TEMPERATURE: 98 F | WEIGHT: 143.08 LBS | SYSTOLIC BLOOD PRESSURE: 144 MMHG

## 2023-05-12 VITALS
TEMPERATURE: 98 F | HEART RATE: 70 BPM | RESPIRATION RATE: 20 BRPM | OXYGEN SATURATION: 100 % | SYSTOLIC BLOOD PRESSURE: 106 MMHG | DIASTOLIC BLOOD PRESSURE: 78 MMHG

## 2023-05-12 LAB
ALBUMIN SERPL ELPH-MCNC: 4.3 G/DL — SIGNIFICANT CHANGE UP (ref 3.3–5)
ALP SERPL-CCNC: 55 U/L — SIGNIFICANT CHANGE UP (ref 40–120)
ALT FLD-CCNC: 42 U/L — SIGNIFICANT CHANGE UP (ref 10–45)
ANION GAP SERPL CALC-SCNC: 14 MMOL/L — SIGNIFICANT CHANGE UP (ref 5–17)
AST SERPL-CCNC: 42 U/L — HIGH (ref 10–40)
BASOPHILS # BLD AUTO: 0.01 K/UL — SIGNIFICANT CHANGE UP (ref 0–0.2)
BASOPHILS NFR BLD AUTO: 0.2 % — SIGNIFICANT CHANGE UP (ref 0–2)
BILIRUB SERPL-MCNC: 0.3 MG/DL — SIGNIFICANT CHANGE UP (ref 0.2–1.2)
BUN SERPL-MCNC: 13 MG/DL — SIGNIFICANT CHANGE UP (ref 7–23)
CALCIUM SERPL-MCNC: 9.3 MG/DL — SIGNIFICANT CHANGE UP (ref 8.4–10.5)
CHLORIDE SERPL-SCNC: 102 MMOL/L — SIGNIFICANT CHANGE UP (ref 96–108)
CO2 SERPL-SCNC: 20 MMOL/L — LOW (ref 22–31)
CREAT SERPL-MCNC: 0.59 MG/DL — SIGNIFICANT CHANGE UP (ref 0.5–1.3)
EGFR: 120 ML/MIN/1.73M2 — SIGNIFICANT CHANGE UP
EOSINOPHIL # BLD AUTO: 0.03 K/UL — SIGNIFICANT CHANGE UP (ref 0–0.5)
EOSINOPHIL NFR BLD AUTO: 0.5 % — SIGNIFICANT CHANGE UP (ref 0–6)
GLUCOSE SERPL-MCNC: 92 MG/DL — SIGNIFICANT CHANGE UP (ref 70–99)
HCT VFR BLD CALC: 36 % — SIGNIFICANT CHANGE UP (ref 34.5–45)
HGB BLD-MCNC: 12.4 G/DL — SIGNIFICANT CHANGE UP (ref 11.5–15.5)
IMM GRANULOCYTES NFR BLD AUTO: 0.2 % — SIGNIFICANT CHANGE UP (ref 0–0.9)
LIDOCAIN IGE QN: 34 U/L — SIGNIFICANT CHANGE UP (ref 7–60)
LYMPHOCYTES # BLD AUTO: 2.09 K/UL — SIGNIFICANT CHANGE UP (ref 1–3.3)
LYMPHOCYTES # BLD AUTO: 36 % — SIGNIFICANT CHANGE UP (ref 13–44)
MCHC RBC-ENTMCNC: 29.7 PG — SIGNIFICANT CHANGE UP (ref 27–34)
MCHC RBC-ENTMCNC: 34.4 GM/DL — SIGNIFICANT CHANGE UP (ref 32–36)
MCV RBC AUTO: 86.3 FL — SIGNIFICANT CHANGE UP (ref 80–100)
MONOCYTES # BLD AUTO: 0.48 K/UL — SIGNIFICANT CHANGE UP (ref 0–0.9)
MONOCYTES NFR BLD AUTO: 8.3 % — SIGNIFICANT CHANGE UP (ref 2–14)
NEUTROPHILS # BLD AUTO: 3.19 K/UL — SIGNIFICANT CHANGE UP (ref 1.8–7.4)
NEUTROPHILS NFR BLD AUTO: 54.8 % — SIGNIFICANT CHANGE UP (ref 43–77)
NRBC # BLD: 0 /100 WBCS — SIGNIFICANT CHANGE UP (ref 0–0)
PLATELET # BLD AUTO: 221 K/UL — SIGNIFICANT CHANGE UP (ref 150–400)
POTASSIUM SERPL-MCNC: 3.3 MMOL/L — LOW (ref 3.5–5.3)
POTASSIUM SERPL-SCNC: 3.3 MMOL/L — LOW (ref 3.5–5.3)
PROT SERPL-MCNC: 7.2 G/DL — SIGNIFICANT CHANGE UP (ref 6–8.3)
RBC # BLD: 4.17 M/UL — SIGNIFICANT CHANGE UP (ref 3.8–5.2)
RBC # FLD: 12 % — SIGNIFICANT CHANGE UP (ref 10.3–14.5)
SODIUM SERPL-SCNC: 136 MMOL/L — SIGNIFICANT CHANGE UP (ref 135–145)
WBC # BLD: 5.81 K/UL — SIGNIFICANT CHANGE UP (ref 3.8–10.5)
WBC # FLD AUTO: 5.81 K/UL — SIGNIFICANT CHANGE UP (ref 3.8–10.5)

## 2023-05-12 PROCEDURE — 99284 EMERGENCY DEPT VISIT MOD MDM: CPT

## 2023-05-12 RX ORDER — POTASSIUM CHLORIDE 20 MEQ
40 PACKET (EA) ORAL ONCE
Refills: 0 | Status: COMPLETED | OUTPATIENT
Start: 2023-05-12 | End: 2023-05-12

## 2023-05-12 RX ORDER — SODIUM CHLORIDE 9 MG/ML
2000 INJECTION, SOLUTION INTRAVENOUS ONCE
Refills: 0 | Status: COMPLETED | OUTPATIENT
Start: 2023-05-12 | End: 2023-05-12

## 2023-05-12 RX ADMIN — SODIUM CHLORIDE 2000 MILLILITER(S): 9 INJECTION, SOLUTION INTRAVENOUS at 21:21

## 2023-05-12 RX ADMIN — Medication 40 MILLIEQUIVALENT(S): at 22:33

## 2023-05-12 NOTE — ED PROVIDER NOTE - CLINICAL SUMMARY MEDICAL DECISION MAKING FREE TEXT BOX
given symptoms and daughters, likely represents prolonged acute gastroenteritis.  Will place IV, basic labs, fluid resuscitate with normal saline boluses.  No family history of ulcerative colitis nor Crohn's disease, no concern for inflammatory bowel disease at this time.  Some degree of dehydration given lightheadedness at home with positional changes and exertion.  Karthikeyan Michelle MD

## 2023-05-12 NOTE — ED PROVIDER NOTE - OBJECTIVE STATEMENT
38-year-old female, 10 weeks pregnant, G3, P2, here for 5 days of diarrhea.  Nonbloody.  2 daughters at home with similar symptoms.  Had in-home IV therapy this morning but still with profuse diarrhea also complaining of nausea so came to urgent care.  However they were concerned given duration of diarrhea so sent to emergency department.  States she has gone slightly lightheaded with positional changes, is able to tolerate p.o. but + N. No fever. + diffuse abd pain.  denies severe nausea vomiting with previous pregnancies.

## 2023-05-12 NOTE — ED PROVIDER NOTE - PROGRESS NOTE DETAILS
Feels significantly improved with IVF. Tolerating PO in ED. Labs reassuring, likely dc home after fluids finish. Karthikeyan Michelle MD Gagan- Fetal HR obtained with Dr. Bright -

## 2023-05-12 NOTE — ED ADULT NURSE NOTE - OBJECTIVE STATEMENT
38y female A&OX4 coming in through triage complaining of diarrhea. No PMHX. Pt reports being 10 weeks preg and this is her third baby. Pt reports having diarrhea for five days and states when she eats or drinks "it goes right through me". Pt abdomen is soft and non distended. Pt states her daughter had the same thing and she got better after two days. Pt denies any travel, chest pain, shortness of breath, cough, burning in urination. Labs was drawn and sent to lab. Pt pending dispo.

## 2023-05-12 NOTE — ED PROVIDER NOTE - ATTENDING CONTRIBUTION TO CARE
Attending (Prakash Doyle D.O.):  I have personally seen and examined this patient. I have performed a substantive portion of the visit including all aspects of the medical decision making. Resident, fellow, student, and/or ACP note reviewed. I agree on the plan of care except where noted.    38-year-old female, 10 weeks pregnant, G3, P2, here for 5 days of diarrhea.  Nonbloody.  2 daughters at home with similar symptoms, had iv therapy at home this past tues w/o sig improvement. Denies fever, chills. Still tolerating PO but "goes right through". Hemodynam stable, cap refil 3 seconds, slightly dry mucous membranes. + mild diffuse abd ttp, no peritoneal signs, no petechia, no jaundice, no signs of anemia. Hx and exam favors viral enteritis. No signs of bacterial infx requiring abxs. Do not suspect acute hepatobiliary path. Will check labs, hydrate, fhr, reassess -> FHR 160s, better hydration. Patient is hemodynamically stable, feels improved. All w/u, results discussed at length w/ patient. All questions answered. Strict return precautions provided w/ verbal understanding expressed. Stable for dc w/ close outpt f/u.

## 2023-05-12 NOTE — ED PROVIDER NOTE - PATIENT PORTAL LINK FT
You can access the FollowMyHealth Patient Portal offered by [facility] by registering at the following website: [email or web address]. By joining [employer]’s FollowMyHealth portal, you will also be able to view your health information using other applications (apps) compatible with our system.

## 2023-05-12 NOTE — ED ADULT TRIAGE NOTE - CHIEF COMPLAINT QUOTE
diarrhea x 5 days, approximately 10 episodes per day. both kids recently with stomach bug. 10 weeks pregnant, unable to eat, denies vomiting

## 2023-05-12 NOTE — ED ADULT NURSE NOTE - NSFALLUNIVINTERV_ED_ALL_ED
Bed/Stretcher in lowest position, wheels locked, appropriate side rails in place/Call bell, personal items and telephone in reach/Instruct patient to call for assistance before getting out of bed/chair/stretcher/Non-slip footwear applied when patient is off stretcher/[place] to call system/Physically safe environment - no spills, clutter or unnecessary equipment/Purposeful proactive rounding/Room/bathroom lighting operational, light cord in reach

## 2023-05-12 NOTE — ED PROVIDER NOTE - NSFOLLOWUPINSTRUCTIONS_ED_ALL_ED_FT
DISCHARGE INSTRUCTIONS:  Seek care immediately if:  You have a seizure.  You are confused or cannot think clearly.  You are extremely sleepy, or another person cannot wake you.  You become dizzy or faint when you stand.  You are not able to urinate.  You have trouble breathing.  You have a fast or irregular heartbeat.  Your hands or feet are cold, or your face is pale.    Contact your healthcare provider if:  You have trouble drinking liquids because you are vomiting.  Your symptoms get worse.  You have a fever.  You feel very weak or tired.  You have questions or concerns about your condition or care.  Follow up with your healthcare provider as directed:  Write down your questions so you remember to ask them during your visits.    RETURN TO ED IMMEDIATELY IF ANY OTHER CONCERNS ARISE

## 2023-10-25 NOTE — OB RN TRIAGE NOTE - NSLDARRIVAL_OBGYN_ALL_OB_START_DATE
16-Apr-2021 22:04 Albendazole Pregnancy And Lactation Text: This medication is Pregnancy Category C and it isn't known if it is safe during pregnancy. It is also excreted in breast milk.

## 2023-11-14 NOTE — OB RN TRIAGE NOTE - NS_VISITREASON1_OBGYN_ALL_OB
TRANSPLANT NEPHROLOGY CHRONIC POST TRANSPLANT VISIT    Assessment & Plan   # DDKT: uptrend in Cr likely prerenal azotemia in the setting of diarrhea, repeat next week, due for DSA, last FK subtx, UA/Ucx, CMV, BK              - Baseline Creatinine: ~ 1.4-1.6              - Proteinuria: Normal (<0.2 grams)              - Date DSA Last Checked: May/2023      Latest DSA: No     cPRA: 49%              - BK Viremia: No              - Kidney Tx Biopsy: No              - Transplant Ureteral Stent: Removed     # Immunosuppression: Tacrolimus immediate release (goal 6-8) and Mycophenolic acid (dose 540 mg every 12 hours)               - Induction with Recent Transplant:  Intermediate Intensity              - Continue with intensive monitoring of immunosuppression for efficacy and toxicity.              - Changes: no     # Infection Prophylaxis:   - PJP: Sulfa/TMP (Bactrim)  - CMV: completed Valganciclovir (Valcyte); Patient is CMV IgG Ab discordant (D+/R-) and can now stop Valcyte being 6 months post transplant.  Will check CMV PCR monthly until 12 months post transplant.      # Blood Pressure: Controlled;          Goal BP: < 130/80              - Volume status: Euvolemic              - Changes: Not at this time     # Anemia in Chronic Renal Disease: Hgb: Stable      SARA: No              - Iron studies: Low iron saturation, but high ferritin     # Mineral Bone Disorder:   - Secondary renal hyperparathyroidism; PTH level: Minimally elevated ( pg/ml)        On treatment: None  - Vitamin D; level: Low        On supplement: Yes  - Calcium; level: Normal        On supplement: No     # Electrolytes:   - Potassium; level: Normal        On supplement: No  - Magnesium; level: Stable low        On supplement: Yes  - Bicarbonate; level: Normal        On supplement: Yes; sodium bicarbonate 1300 mg bid.     # H/o Cardiomyopathy: Normal LVEF at 55-60% with last cardiac echo 9/2022.     # Recurrent UTI/Pyelonephritis: asymptomatic at  present. cystoscopy 7/2023 unremarkable, seen by urology. Follows with ID. He was previously on nitrofurantoin for prophylaxis then switched to fosfomycin per TID recs. He is no longer taking     # Urethral Stricture: Patient is s/p buccal urethroplasty and diverticulum excision 12/2020. Now s/p cystoscopy 7/2023 due to recurrent UTIs and this was unremarkable.  Follows with Urology.      # Recurrent Cdiff: completing vancomycin taper as of 11/10, symptoms improved, now down to 2 BM s a day.     # EBV Viremia: Minimal EBV PCR at ~ 1200, likely of no clinical significance.     # Diarrhea: Worsened with antibiotics, but now improved, near baseline.  He is taking fiber.     # H/o Polysubstance Abuse: Patient with h/o methamphetamine and alcohol abuse.  Now sober.     # Medical Compliance: Yes     # Health Maintenance and Vaccination Review: recommend COVID Flu RSV vaccines, he will wait till recovery from cdiff     # Transplant History:  Etiology of Kidney Failure: Solitary congenital kidney and h/o urethral calculus and urologic issues  Tx: DDKT  Transplant: 1/15/2023 (Kidney)  Donor Type: Donation after Circulatory Death  Donor Class:   Crossmatch at time of Tx: negative  DSA at time of Tx: No  Significant changes in immunosuppression: None  CMV IgG Ab High Risk Discordance (D+/R-): Yes  EBV IgG Ab High Risk Discordance (D+/R-): No  Significant transplant-related complications: None    Transplant Office Phone Number: [phone]    Assessment and plan was discussed with the patient and he voiced his understanding and agreement.    Return visit:2 months    Georgie Herrera MD    Chief Complaint   Mr. Fowler is a 41 year old here for kidney transplant, immunosuppression management, and JACK.    History of Present Illness   Feels better overall. 2nd episode of Cdiff colitis started vancomycin 11/9 with severe watery diarrhea now improved, down to twice a day using fibers, ran out 1 day and will get refills. He had 1  episode of chills but no fevers. Had transient abdominal cramps but no abdominal pain at present. No nausea or vomiting, no weight loss. Denies any dysuria or graft pain  Denies any missed IS meds    IS: Fk 3.5/3.5 /540  Ppx: bactrim  Home BP: 132/84    Problem List   Patient Active Problem List   Diagnosis    HTN, kidney transplant related    Urethral stricture    Methamphetamine abuse, episodic (H)    Urethral diverticulum    Allergic rhinitis, unspecified seasonality, unspecified trigger    Stage 3a chronic kidney disease (H)    Kidney replaced by transplant    Immunosuppressed status (H24)    Aftercare following organ transplant    Need for pneumocystis prophylaxis    Anemia in chronic renal disease    Secondary renal hyperparathyroidism (H24)    Vitamin D deficiency    Hypomagnesemia    Diarrhea    Urinary tract infection without hematuria, site unspecified    Metabolic acidosis    EBV (Aaron-Barr virus) viremia    Pyelonephritis of transplanted kidney    CMV (cytomegalovirus infection) (H)    Neutropenia (H24)       Allergies   No Known Allergies    Medications   Current Outpatient Medications   Medication Sig    magnesium oxide (MAG-OX) 400 MG tablet Take 2 tablets (800 mg) by mouth 2 times daily    mycophenolic acid (GENERIC EQUIVALENT) 180 MG EC tablet Take 3 tablets (540 mg) by mouth 2 times daily    psyllium (METAMUCIL/KONSYL) 58.6 % powder Take 18 g (1 Tablespoonful) by mouth 2 times daily as needed (Diarrhea)    sodium bicarbonate 650 MG tablet Take 2 tablets (1,300 mg) by mouth 2 times daily    sulfamethoxazole-trimethoprim (BACTRIM) 400-80 MG tablet Take 1 tablet by mouth daily    tacrolimus (GENERIC EQUIVALENT) 0.5 MG capsule Take 1 capsule (0.5 mg) by mouth 2 times daily Total dose = 3.5 mg twice a day    tacrolimus (GENERIC EQUIVALENT) 1 MG capsule Take 3 capsules (3 mg) by mouth 2 times daily Total dose = 3.5 mg twice per day    vancomycin (VANCOCIN) 125 MG capsule Vanco  14d of  treatment,125 mg QID  then would taper slowly as follows: 125mg q8 x1 week, q12 x 1 week, daily x1 week, every other day x2 weeks    Vitamin D, Cholecalciferol, 50 MCG (2000 UT) CAPS Take 2,000 Units by mouth daily    fosfomycin (MONUROL) 3 g Packet Take 1 packet (3 g) by mouth every 72 hours (Patient not taking: Reported on 9/24/2023)     No current facility-administered medications for this visit.     There are no discontinued medications.    Physical Exam   Vital Signs: /74   Pulse 68   Temp 98.3  F (36.8  C) (Oral)   Wt 71.9 kg (158 lb 8 oz)   SpO2 100%   BMI 22.11 kg/m      GENERAL APPEARANCE: alert and no distress  HENT: mouth without ulcers or lesions  RESP: lungs clear to auscultation - no rales, rhonchi or wheezes  CV: regular rhythm, normal rate, no rub, no murmur  EDEMA: no LE edema bilaterally  ABDOMEN: soft, nondistended, nontender, bowel sounds normal  MS: extremities normal - no gross deformities noted, no evidence of inflammation in joints, no muscle tenderness  SKIN: no rash  Access none previously on PD      Data         Latest Ref Rng & Units 11/6/2023     2:46 PM 10/23/2023     2:45 PM 10/16/2023     3:05 PM   Renal   Sodium 135 - 145 mmol/L 136  138  138    K 3.4 - 5.3 mmol/L 4.1  4.3  4.5    Cl 98 - 107 mmol/L 103  105  105    Cl (external) 98 - 107 mmol/L 103  105  105    CO2 22 - 29 mmol/L 25  26  24    Urea Nitrogen 6.0 - 20.0 mg/dL 19.3  21.4  24.6    Creatinine 0.67 - 1.17 mg/dL 1.74  1.66  1.53    Glucose 70 - 99 mg/dL 86  79  71    Calcium 8.6 - 10.0 mg/dL 9.0  9.2  9.1          Latest Ref Rng & Units 8/22/2023     2:48 PM 7/10/2023     2:52 PM 7/8/2023     6:05 AM   Bone Health   Phosphorus 2.5 - 4.5 mg/dL 2.6  2.7  3.7          Latest Ref Rng & Units 11/6/2023     2:46 PM 10/23/2023     2:45 PM 10/16/2023     3:05 PM   Heme   WBC 4.0 - 11.0 10e3/uL 7.5  3.4  3.2    Hgb 13.3 - 17.7 g/dL 11.6  12.1  11.6    Plt 150 - 450 10e3/uL 199  210  214    ABSOLUTE NEUTROPHIL 1.6 - 8.3  10e3/uL   1.2    ABSOLUTE LYMPHOCYTES 0.8 - 5.3 10e3/uL   1.1    ABSOLUTE MONOCYTES 0.0 - 1.3 10e3/uL   0.4    ABSOLUTE EOSINOPHILS 0.0 - 0.7 10e3/uL   0.4          Latest Ref Rng & Units 7/24/2023     3:19 PM 7/4/2023     7:44 PM 6/5/2023     3:27 PM   Liver   AP 40 - 129 U/L 59  53  55    TBili <=1.2 mg/dL 0.3  0.4  0.3    Bilirubin Direct 0.00 - 0.30 mg/dL <0.20      ALT 0 - 70 U/L 20  14  22    AST 0 - 45 U/L 29  27  24    Tot Protein 6.4 - 8.3 g/dL 7.2  7.4  6.9    Albumin 3.5 - 5.2 g/dL 4.3  4.2  4.0          Latest Ref Rng & Units 7/24/2023     3:19 PM 3/19/2023     9:55 PM 1/15/2023     5:40 AM   Pancreas   A1C <5.7 % 5.5   5.2    Lipase (Roche) 13 - 60 U/L  20           Latest Ref Rng & Units 1/31/2023    10:49 AM 1/20/2023     7:46 AM   Iron studies   Iron 61 - 157 ug/dL 70  153    Iron Sat Index 15 - 46 % 27  63    Ferritin 31 - 409 ng/mL 430  717          Latest Ref Rng & Units 10/16/2023     3:05 PM 10/9/2023     2:40 PM 9/25/2023     3:41 PM   [facility] Txp Virology   LOG IU/ML OF CMVQNT  <1.5  <1.5     EBV DNA COPIES/ML Not Detected copies/mL   Not Detected        Recent Labs   Lab Test 10/16/23  1505 10/23/23  1445 11/06/23  1446   DOSTAC 10/16/2023 10/23/2023 11/6/2023   TACROL 6.4 8.3 5.6     Recent Labs   Lab Test 10/02/23  1503 10/09/23  1440 10/16/23  1505   DOSMPA 10/2/2023   3:50 AM 10/9/2023   3:20 AM 10/16/2023   3:20 AM   MPACID 0.85* 0.88* <0.25*   MPAG 23.0* 11.9* 12.2*      Labor at Term

## 2023-11-16 NOTE — OB RN TRIAGE NOTE - NS_LASTFOOD_OBGYN_ALL_OB_FT
Salad and maya bread
Initiate Treatment: 5-FU BID X2 WEEKS
Render In Strict Bullet Format?: No
Detail Level: Zone

## 2023-11-28 ENCOUNTER — INPATIENT (INPATIENT)
Facility: HOSPITAL | Age: 38
LOS: 0 days | Discharge: HOME | End: 2023-11-29
Attending: OBSTETRICS & GYNECOLOGY | Admitting: OBSTETRICS & GYNECOLOGY
Payer: COMMERCIAL

## 2023-11-28 VITALS — OXYGEN SATURATION: 100 %

## 2023-11-28 DIAGNOSIS — Z3A.39 39 WEEKS GESTATION OF PREGNANCY: ICD-10-CM

## 2023-11-28 LAB
BASOPHILS # BLD AUTO: 0.03 K/UL — SIGNIFICANT CHANGE UP (ref 0–0.2)
BASOPHILS # BLD AUTO: 0.03 K/UL — SIGNIFICANT CHANGE UP (ref 0–0.2)
BASOPHILS NFR BLD AUTO: 0.4 % — SIGNIFICANT CHANGE UP (ref 0–2)
BASOPHILS NFR BLD AUTO: 0.4 % — SIGNIFICANT CHANGE UP (ref 0–2)
BLD GP AB SCN SERPL QL: NEGATIVE — SIGNIFICANT CHANGE UP
BLD GP AB SCN SERPL QL: NEGATIVE — SIGNIFICANT CHANGE UP
EOSINOPHIL # BLD AUTO: 0.08 K/UL — SIGNIFICANT CHANGE UP (ref 0–0.5)
EOSINOPHIL # BLD AUTO: 0.08 K/UL — SIGNIFICANT CHANGE UP (ref 0–0.5)
EOSINOPHIL NFR BLD AUTO: 1.1 % — SIGNIFICANT CHANGE UP (ref 0–6)
EOSINOPHIL NFR BLD AUTO: 1.1 % — SIGNIFICANT CHANGE UP (ref 0–6)
HCT VFR BLD CALC: 31.7 % — LOW (ref 34.5–45)
HCT VFR BLD CALC: 31.7 % — LOW (ref 34.5–45)
HGB BLD-MCNC: 10.6 G/DL — LOW (ref 11.5–15.5)
HGB BLD-MCNC: 10.6 G/DL — LOW (ref 11.5–15.5)
IMM GRANULOCYTES NFR BLD AUTO: 0.4 % — SIGNIFICANT CHANGE UP (ref 0–0.9)
IMM GRANULOCYTES NFR BLD AUTO: 0.4 % — SIGNIFICANT CHANGE UP (ref 0–0.9)
LYMPHOCYTES # BLD AUTO: 1.9 K/UL — SIGNIFICANT CHANGE UP (ref 1–3.3)
LYMPHOCYTES # BLD AUTO: 1.9 K/UL — SIGNIFICANT CHANGE UP (ref 1–3.3)
LYMPHOCYTES # BLD AUTO: 26.6 % — SIGNIFICANT CHANGE UP (ref 13–44)
LYMPHOCYTES # BLD AUTO: 26.6 % — SIGNIFICANT CHANGE UP (ref 13–44)
MCHC RBC-ENTMCNC: 28.8 PG — SIGNIFICANT CHANGE UP (ref 27–34)
MCHC RBC-ENTMCNC: 28.8 PG — SIGNIFICANT CHANGE UP (ref 27–34)
MCHC RBC-ENTMCNC: 33.4 GM/DL — SIGNIFICANT CHANGE UP (ref 32–36)
MCHC RBC-ENTMCNC: 33.4 GM/DL — SIGNIFICANT CHANGE UP (ref 32–36)
MCV RBC AUTO: 86.1 FL — SIGNIFICANT CHANGE UP (ref 80–100)
MCV RBC AUTO: 86.1 FL — SIGNIFICANT CHANGE UP (ref 80–100)
MONOCYTES # BLD AUTO: 0.5 K/UL — SIGNIFICANT CHANGE UP (ref 0–0.9)
MONOCYTES # BLD AUTO: 0.5 K/UL — SIGNIFICANT CHANGE UP (ref 0–0.9)
MONOCYTES NFR BLD AUTO: 7 % — SIGNIFICANT CHANGE UP (ref 2–14)
MONOCYTES NFR BLD AUTO: 7 % — SIGNIFICANT CHANGE UP (ref 2–14)
NEUTROPHILS # BLD AUTO: 4.6 K/UL — SIGNIFICANT CHANGE UP (ref 1.8–7.4)
NEUTROPHILS # BLD AUTO: 4.6 K/UL — SIGNIFICANT CHANGE UP (ref 1.8–7.4)
NEUTROPHILS NFR BLD AUTO: 64.5 % — SIGNIFICANT CHANGE UP (ref 43–77)
NEUTROPHILS NFR BLD AUTO: 64.5 % — SIGNIFICANT CHANGE UP (ref 43–77)
NRBC # BLD: 0 /100 WBCS — SIGNIFICANT CHANGE UP (ref 0–0)
NRBC # BLD: 0 /100 WBCS — SIGNIFICANT CHANGE UP (ref 0–0)
PLATELET # BLD AUTO: 198 K/UL — SIGNIFICANT CHANGE UP (ref 150–400)
PLATELET # BLD AUTO: 198 K/UL — SIGNIFICANT CHANGE UP (ref 150–400)
RBC # BLD: 3.68 M/UL — LOW (ref 3.8–5.2)
RBC # BLD: 3.68 M/UL — LOW (ref 3.8–5.2)
RBC # FLD: 12.8 % — SIGNIFICANT CHANGE UP (ref 10.3–14.5)
RBC # FLD: 12.8 % — SIGNIFICANT CHANGE UP (ref 10.3–14.5)
RH IG SCN BLD-IMP: POSITIVE — SIGNIFICANT CHANGE UP
T PALLIDUM AB TITR SER: NEGATIVE — SIGNIFICANT CHANGE UP
T PALLIDUM AB TITR SER: NEGATIVE — SIGNIFICANT CHANGE UP
WBC # BLD: 7.14 K/UL — SIGNIFICANT CHANGE UP (ref 3.8–10.5)
WBC # BLD: 7.14 K/UL — SIGNIFICANT CHANGE UP (ref 3.8–10.5)
WBC # FLD AUTO: 7.14 K/UL — SIGNIFICANT CHANGE UP (ref 3.8–10.5)
WBC # FLD AUTO: 7.14 K/UL — SIGNIFICANT CHANGE UP (ref 3.8–10.5)

## 2023-11-28 RX ORDER — DIPHENHYDRAMINE HCL 50 MG
25 CAPSULE ORAL EVERY 6 HOURS
Refills: 0 | Status: DISCONTINUED | OUTPATIENT
Start: 2023-11-28 | End: 2023-11-29

## 2023-11-28 RX ORDER — CITRIC ACID/SODIUM CITRATE 300-500 MG
15 SOLUTION, ORAL ORAL EVERY 6 HOURS
Refills: 0 | Status: DISCONTINUED | OUTPATIENT
Start: 2023-11-28 | End: 2023-11-28

## 2023-11-28 RX ORDER — BENZOCAINE 10 %
1 GEL (GRAM) MUCOUS MEMBRANE EVERY 6 HOURS
Refills: 0 | Status: DISCONTINUED | OUTPATIENT
Start: 2023-11-28 | End: 2023-11-29

## 2023-11-28 RX ORDER — SODIUM CHLORIDE 9 MG/ML
1000 INJECTION, SOLUTION INTRAVENOUS
Refills: 0 | Status: DISCONTINUED | OUTPATIENT
Start: 2023-11-28 | End: 2023-11-28

## 2023-11-28 RX ORDER — OXYTOCIN 10 UNIT/ML
41.67 VIAL (ML) INJECTION
Qty: 20 | Refills: 0 | Status: DISCONTINUED | OUTPATIENT
Start: 2023-11-28 | End: 2023-11-29

## 2023-11-28 RX ORDER — IBUPROFEN 200 MG
600 TABLET ORAL EVERY 6 HOURS
Refills: 0 | Status: COMPLETED | OUTPATIENT
Start: 2023-11-28 | End: 2024-10-26

## 2023-11-28 RX ORDER — OXYCODONE HYDROCHLORIDE 5 MG/1
5 TABLET ORAL
Refills: 0 | Status: DISCONTINUED | OUTPATIENT
Start: 2023-11-28 | End: 2023-11-29

## 2023-11-28 RX ORDER — ACETAMINOPHEN 500 MG
975 TABLET ORAL
Refills: 0 | Status: DISCONTINUED | OUTPATIENT
Start: 2023-11-28 | End: 2023-11-29

## 2023-11-28 RX ORDER — DIBUCAINE 1 %
1 OINTMENT (GRAM) RECTAL EVERY 6 HOURS
Refills: 0 | Status: DISCONTINUED | OUTPATIENT
Start: 2023-11-28 | End: 2023-11-29

## 2023-11-28 RX ORDER — LANOLIN
1 OINTMENT (GRAM) TOPICAL EVERY 6 HOURS
Refills: 0 | Status: DISCONTINUED | OUTPATIENT
Start: 2023-11-28 | End: 2023-11-29

## 2023-11-28 RX ORDER — IBUPROFEN 200 MG
600 TABLET ORAL EVERY 6 HOURS
Refills: 0 | Status: DISCONTINUED | OUTPATIENT
Start: 2023-11-28 | End: 2023-11-29

## 2023-11-28 RX ORDER — KETOROLAC TROMETHAMINE 30 MG/ML
30 SYRINGE (ML) INJECTION ONCE
Refills: 0 | Status: DISCONTINUED | OUTPATIENT
Start: 2023-11-28 | End: 2023-11-28

## 2023-11-28 RX ORDER — PRAMOXINE HYDROCHLORIDE 150 MG/15G
1 AEROSOL, FOAM RECTAL EVERY 4 HOURS
Refills: 0 | Status: DISCONTINUED | OUTPATIENT
Start: 2023-11-28 | End: 2023-11-29

## 2023-11-28 RX ORDER — MAGNESIUM HYDROXIDE 400 MG/1
30 TABLET, CHEWABLE ORAL
Refills: 0 | Status: DISCONTINUED | OUTPATIENT
Start: 2023-11-28 | End: 2023-11-29

## 2023-11-28 RX ORDER — OXYTOCIN 10 UNIT/ML
VIAL (ML) INJECTION
Qty: 30 | Refills: 0 | Status: DISCONTINUED | OUTPATIENT
Start: 2023-11-28 | End: 2023-11-28

## 2023-11-28 RX ORDER — HYDROCORTISONE 1 %
1 OINTMENT (GRAM) TOPICAL EVERY 6 HOURS
Refills: 0 | Status: DISCONTINUED | OUTPATIENT
Start: 2023-11-28 | End: 2023-11-29

## 2023-11-28 RX ORDER — TETANUS TOXOID, REDUCED DIPHTHERIA TOXOID AND ACELLULAR PERTUSSIS VACCINE, ADSORBED 5; 2.5; 8; 8; 2.5 [IU]/.5ML; [IU]/.5ML; UG/.5ML; UG/.5ML; UG/.5ML
0.5 SUSPENSION INTRAMUSCULAR ONCE
Refills: 0 | Status: DISCONTINUED | OUTPATIENT
Start: 2023-11-28 | End: 2023-11-29

## 2023-11-28 RX ORDER — AER TRAVELER 0.5 G/1
1 SOLUTION RECTAL; TOPICAL EVERY 4 HOURS
Refills: 0 | Status: DISCONTINUED | OUTPATIENT
Start: 2023-11-28 | End: 2023-11-29

## 2023-11-28 RX ORDER — OXYCODONE HYDROCHLORIDE 5 MG/1
5 TABLET ORAL ONCE
Refills: 0 | Status: DISCONTINUED | OUTPATIENT
Start: 2023-11-28 | End: 2023-11-29

## 2023-11-28 RX ORDER — SODIUM CHLORIDE 9 MG/ML
3 INJECTION INTRAMUSCULAR; INTRAVENOUS; SUBCUTANEOUS EVERY 8 HOURS
Refills: 0 | Status: DISCONTINUED | OUTPATIENT
Start: 2023-11-28 | End: 2023-11-29

## 2023-11-28 RX ORDER — CHLORHEXIDINE GLUCONATE 213 G/1000ML
1 SOLUTION TOPICAL DAILY
Refills: 0 | Status: DISCONTINUED | OUTPATIENT
Start: 2023-11-28 | End: 2023-11-28

## 2023-11-28 RX ORDER — SIMETHICONE 80 MG/1
80 TABLET, CHEWABLE ORAL EVERY 4 HOURS
Refills: 0 | Status: DISCONTINUED | OUTPATIENT
Start: 2023-11-28 | End: 2023-11-29

## 2023-11-28 RX ORDER — OXYTOCIN 10 UNIT/ML
333.33 VIAL (ML) INJECTION
Qty: 20 | Refills: 0 | Status: COMPLETED | OUTPATIENT
Start: 2023-11-28 | End: 2023-11-28

## 2023-11-28 RX ADMIN — Medication 1000 MILLIUNIT(S)/MIN: at 13:17

## 2023-11-28 RX ADMIN — Medication 125 MILLIUNIT(S)/MIN: at 13:46

## 2023-11-28 RX ADMIN — Medication 2 MILLIUNIT(S)/MIN: at 11:05

## 2023-11-28 RX ADMIN — SODIUM CHLORIDE 125 MILLILITER(S): 9 INJECTION, SOLUTION INTRAVENOUS at 05:02

## 2023-11-28 RX ADMIN — Medication 30 MILLIGRAM(S): at 14:17

## 2023-11-28 NOTE — PRE-ANESTHESIA EVALUATION ADULT - NSANTHADDINFOFT_GEN_ALL_CORE
Anesthetic plan, including risks and benefits, discussed extensively with patient.  Risks included but not limited to: accidental PDPH, back pain, nerve damage, bleeding, infection, hypotension, change to FHR, nausea/vomiting, ineffective analgesia requiring replacement.  Patient conveyed understanding and requesting epidural. Anesthetic plan, including risks and benefits, discussed extensively with patient.  Risks included but not limited to: accidental PDPH, back pain, nerve damage, bleeding, infection, hypotension, change to FHR, nausea/vomiting, ineffective analgesia requiring replacement.  Previous experience  last pregnancy wasn't able to have epidural and experienced extreme pain; with 1st delivery experienced pain with delivery. Desired analgesia as much as possible for delivery.  Extensive discussion re risks and benefits of DPE vs Epidural - inherent risk of post-dural puncture headache is slightly higher with intentional dural puncture. Patient aware and accepting. Will use 27 ga spinal as patient 3 cm dilated and clyde but 2/10 intensity.  Patient conveyed understanding and requesting dural-puncture epidural.

## 2023-11-28 NOTE — OB PROVIDER LABOR PROGRESS NOTE - ASSESSMENT
P2 39wk for elective IOL.   3.5/60/-3, arom clear fluid.   good application of vertex to cervix.   small variable after arom resolved.   lateral positioning.   has epidural comfortable.   to start pitocin.   disc IOL process, vd, operative vd, and  in case of emergency. accepts blood products in emergency.   pt and  agree with plan.   Keo JEONG
P2 39w0d for elective IOL.   comfortable after epi top off.   8cm. on peanut ball.   reassuring maternal and fetal status.   set up/hopeful for VD.   Keo JEONG

## 2023-11-28 NOTE — OB PROVIDER H&P - HISTORY OF PRESENT ILLNESS
OB PA Admission H&P    38yoF  @39 weeks gestation (HUSSEIN 23) presents for eIOL. PNC uncomplicated. GBS negative. Endorses +FM. Denies -LOF. -CTXs. -VB. Pt denies any other concerns.    – PNC: AMA. GBS negative. EFW 3200g.   – OBHx:   ()  FT 6#11 uncomplicated  () MAB no d&c   ()  FT 7#5 uncomplicated    – GynHx: denies h/o fibroids, ovarian cysts, abnl pap smears, STDs  – PMH: denies h/o HTN, DM, asthma, thyroid disorders, bleeding disorders, h/o blood transfusions   – PSH: denies  – Psych: h/o +anxiety, denies h/o depression/PPD  – Social: denies alcohol/tobacco/drug use in pregnancy   – Meds: PNV, Iron   – Allergies: NKDA  – Will accept blood transfusions: Yes    Vital Signs Last 24 Hrs  T(C): 36.6 (2023 04:02), Max: 36.6 (2023 04:02)  T(F): 97.88 (2023 04:02), Max: 97.88 (2023 04:02)  HR: 86 (2023 05:11) (72 - 101)  BP: 114/78 (2023 04:02) (114/78 - 114/78)  SpO2: 94% (2023 05:11) (89% - 100%)    Gen: NAD  CV: RRR  Lungs: CTA b/l   Abd: gravid, non-tender  Ext: BLE non-edematous, no calf tenderness b/l     – VE: 2/50/-3  – FHT: baseline 130, mod variability, +accels, -decels  – [place]: irregular   – EFW: 3200g   – Sono: vertex

## 2023-11-28 NOTE — OB RN DELIVERY SUMMARY - NS_SEPSISRSKCALC_OBGYN_ALL_OB_FT
EOS calculated successfully. EOS Risk Factor: 0.5/1000 live births ([facility] national incidence); GA=39w;Temp=98.78; ROM=2.633; GBS='Unknown'; Antibiotics='No antibiotics or any antibiotics < 2 hrs prior to birth'

## 2023-11-28 NOTE — OB RN DELIVERY SUMMARY - NSSELHIDDEN_OBGYN_ALL_OB_FT
[NS_DeliveryAttending1_OBGYN_ALL_OB_FT:[id number]],[NS_DeliveryRN_OBGYN_ALL_OB_FT:[id number]]

## 2023-11-28 NOTE — OB PROVIDER H&P - NSLOWPPHRISK_OBGYN_A_OB
No previous uterine incision/[place] Pregnancy/Less than or equal to 4 previous vaginal births/No known bleeding disorder/No history of postpartum hemorrhage/No other PPH risks indicated

## 2023-11-28 NOTE — OB PROVIDER H&P - ATTENDING COMMENTS
P2 39wk for elective IOL.   admitted with routine orders.   3.5/60/-3, arom clear fluid.   good application of vertex to cervix.   small variable after arom resolved.   lateral positioning.   has epidural comfortable.   to start pitocin.   disc IOL process, vd, operative vd, and  in case of emergency. accepts blood products in emergency.   pt and  agree with plan.   Keo JEONG

## 2023-11-28 NOTE — OB PROVIDER H&P - ASSESSMENT
A/P: 38yoF  @39 weeks gestation (HUSSEIN 23) admitted for eIOL. PNC uncomplicated.   - Admit to L&D  - Routine Labs. IVF   - EFM/[employer]: continuous monitoring   - IOL with PO Cytotec   - GBS negative   - Elevated PPH risk 2/2 AMA  - Anesthesia consult -> epidural prn  - D/w CHAR EncarnacionC

## 2023-11-28 NOTE — OB PROVIDER DELIVERY SUMMARY - NSPROVIDERDELIVERYNOTE_OBGYN_ALL_OB_FT
viable female infant. delayed cord clamping.   intact perineum. uterus contracted well on bimanual exam.   ebl 300.   Keo JEONG

## 2023-11-29 ENCOUNTER — TRANSCRIPTION ENCOUNTER (OUTPATIENT)
Age: 38
End: 2023-11-29

## 2023-11-29 VITALS
TEMPERATURE: 98 F | RESPIRATION RATE: 18 BRPM | HEART RATE: 80 BPM | SYSTOLIC BLOOD PRESSURE: 115 MMHG | OXYGEN SATURATION: 98 % | DIASTOLIC BLOOD PRESSURE: 74 MMHG

## 2023-11-29 RX ORDER — ACETAMINOPHEN 500 MG
3 TABLET ORAL
Qty: 30 | Refills: 0
Start: 2023-11-29

## 2023-11-29 RX ORDER — IBUPROFEN 200 MG
1 TABLET ORAL
Qty: 0 | Refills: 0 | DISCHARGE
Start: 2023-11-29

## 2023-11-29 RX ADMIN — Medication 600 MILLIGRAM(S): at 10:05

## 2023-11-29 RX ADMIN — Medication 600 MILLIGRAM(S): at 11:05

## 2023-11-29 RX ADMIN — Medication 1 TABLET(S): at 12:10

## 2023-11-29 NOTE — DISCHARGE NOTE OB - CARE PLAN
Principal Discharge DX:	 (normal spontaneous vaginal delivery)  Assessment and plan of treatment:	After discharge, please stay on pelvic rest for 6 weeks, meaning no sexual intercourse, no tampons and no douching. No lifting objects heavier than baby for two weeks.  Expect to have vaginal bleeding/spotting for up to six weeks.  The bleeding should get lighter and more white/light brown with time.  For bleeding soaking more than a pad an hour or passing clots greater than the size of your fist, call the office.  If your tore, vaginal discharge ranging from brownish-yellowish in color can be normal as it heals.    For pain, take Tylenol 1000mg and Ibuprofen 600mg every 6 hours.  We also recommend taking a stool softener such as Senna daily.    Follow up in the office in 6 weeks.   1

## 2023-11-29 NOTE — DISCHARGE NOTE OB - MEDICATION SUMMARY - MEDICATIONS TO TAKE
I will START or STAY ON the medications listed below when I get home from the hospital:    ibuprofen 600 mg oral tablet  -- 1 tab(s) by mouth every 6 hours  -- Indication: For pain    Prenatal Multivitamins with Folic Acid 1 mg oral tablet  -- 1 tab(s) by mouth once a day  -- Indication: For prenatal

## 2023-11-29 NOTE — DISCHARGE NOTE OB - PATIENT PORTAL LINK FT
You can access the FollowMyHealth Patient Portal offered by [facility] by registering at the following website: [email or web address]. By joining [facility]’s FollowMyHealth portal, you will also be able to view your health information using other applications (apps) compatible with our system.

## 2023-11-29 NOTE — DISCHARGE NOTE OB - HOSPITAL COURSE
Patient had uncomplicated, nonsurgical vaginal delivery.  Please see delivery note for details.  During postpartum course patient's vitals were stable, vaginal bleeding appropriate, and pain well controlled.  On day of discharge patient was ambulating, her pain controlled with oral medications, had adequate oral intake, and was voiding freely.  Discharge instructions and precautions were given.  Will return to the office in 6 weeks for postpartum visit.

## 2023-11-29 NOTE — PROGRESS NOTE ADULT - SUBJECTIVE AND OBJECTIVE BOX
Postpartum Note- PPD#1    Allergies: No Known Allergies    Blood Type A   Positive  Rubella: Immune  RPR : Negative    S:Patient is a  38y   PPD#1  S/P    Patient w/o complaints, pain is controlled.    Pt is OOB, tolerating PO, passing flatus. Lochia WNL.     Feeding: Breast    O:  Vital Signs Last 24 Hrs  T(C): 36.5 (2023 05:22), Max: 37.1 (2023 10:05)  T(F): 97.7 (2023 05:22), Max: 98.78 (2023 10:05)  HR: 70 (2023 05:22) (62 - 102)  BP: 95/58 (2023 05:22) (91/51 - 142/80)  BP(mean): 87 (2023 16:20) (87 - 87)  RR: 18 (2023 05:22) (18 - 18)  SpO2: 99% (2023 05:22) (69% - 100%)    Gen: NAD  Abdomen: Soft, nontender, non-distended, fundus firm.  Vaginal: Lochia WNL  Ext: Neg calf tenderness, neg edema    LABS:    Hemoglobin: 10.6 g/dL ( @ 05:24)      Hematocrit: 31.7 % ( @ 05:24)

## 2023-11-29 NOTE — DISCHARGE NOTE OB - PLAN OF CARE
After discharge, please stay on pelvic rest for 6 weeks, meaning no sexual intercourse, no tampons and no douching. No lifting objects heavier than baby for two weeks.  Expect to have vaginal bleeding/spotting for up to six weeks.  The bleeding should get lighter and more white/light brown with time.  For bleeding soaking more than a pad an hour or passing clots greater than the size of your fist, call the office.  If your tore, vaginal discharge ranging from brownish-yellowish in color can be normal as it heals.    For pain, take Tylenol 1000mg and Ibuprofen 600mg every 6 hours.  We also recommend taking a stool softener such as Senna daily.    Follow up in the office in 6 weeks.

## 2023-11-29 NOTE — PROGRESS NOTE ADULT - NS ATTEND AMEND GEN_ALL_CORE FT
Patient seen and evaluated at bedside.   Agree with above note, exam findings and plan.   Revised where appropriate.     Patient recovering well PPD1 s/p uncomplicated .   Stable for discharge home today after 24h postpartum.   Continue PO Tylenol/Motrin for pain.   To RTO for postpartum visit in 6 weeks.

## 2023-11-29 NOTE — DISCHARGE NOTE OB - CARE PROVIDER_API CALL
Nena Cleary  Obstetrics and Gynecology  [address] [place] [id number]  Phone: [phone]  Fax: [phone]  Follow Up Time:

## 2024-04-11 NOTE — OB RN DELIVERY SUMMARY - BABY A: APGAR 5 MIN MUSCLE TONE, DELIVERY
Vyvanse 60 mg  Allergies&medications reviewed  [facility] [place]  [address]  
(2) well flexed

## 2024-04-16 NOTE — OB RN PATIENT PROFILE - NS_PRENATALHARD_OBGYN_ALL_OB
No pushing, pulling, tugging,  heavy lifting, or strenuous activity.  No major decision making, driving, or drinking alcoholic beverages for 24 hours. ( due to the medications you have  received)  Always use good hand hygiene/washing techniques.  NO driving while taking pain medications.    * if you have an incision:  Check your incision area every day for signs of infection.   Check for:  * more redness, swelling, or pain  *more fluid or blood  *warmth  *pus or bad smell.    To assist you in voiding:  Drink plenty of fluids  Listen to running water while attempting to void.    If you are unable to urinate and you have an uncomfortable urge to void or it has been   6 hours since you were discharged, return to the Emergency Room.    Keep your right foot elevated on pillow to help decrease edema and increase comfort.      Apply ice to incision site as directed per physician, remove, and reapply.  Do not use the ice continuously.  
Available

## 2024-04-23 NOTE — OB PROVIDER H&P - ASSESSMENT
[facility] at Home
34 y/o pt 40 weeks  presents in active labor for pain management  d/w with Dr Ruff-Jon  Plan:  -ADmit l&d. Routine labs  -Expectant management of labor  -Fetus: cat 1 tracing, vertex presentation, continuous monitoring  -GBS negative  -Pain: Patient approved for epidural   -Covid 19 pending for patient and support person  -Consents signed and witnessed at bedside

## 2024-08-06 NOTE — OB RN TRIAGE NOTE - NS_SISCREENINGSR_GEN_ALL_ED
Immunization(s) given during visit:     Immunizations Administered       Name Date Dose Route    TDaP, ADACEL (age 10y-64y), BOOSTRIX (age 10y+), IM, 0.5mL 8/6/2024 0.5 mL Intramuscular    Site: Deltoid- Left    Lot: [id number]    NDC: 06757-764-92             Patient instructed to remain in clinic for 20 minutes after injection and was advised to report any adverse reaction to me immediately.      Negative

## 2024-08-21 ENCOUNTER — NON-APPOINTMENT (OUTPATIENT)
Age: 39
End: 2024-08-21